# Patient Record
Sex: MALE | Race: WHITE | NOT HISPANIC OR LATINO | Employment: FULL TIME | URBAN - METROPOLITAN AREA
[De-identification: names, ages, dates, MRNs, and addresses within clinical notes are randomized per-mention and may not be internally consistent; named-entity substitution may affect disease eponyms.]

---

## 2017-08-24 ENCOUNTER — OFFICE VISIT (OUTPATIENT)
Dept: FAMILY MEDICINE CLINIC | Facility: CLINIC | Age: 37
End: 2017-08-24

## 2017-08-24 VITALS
DIASTOLIC BLOOD PRESSURE: 69 MMHG | TEMPERATURE: 98.5 F | SYSTOLIC BLOOD PRESSURE: 116 MMHG | HEART RATE: 64 BPM | HEIGHT: 73 IN | WEIGHT: 198 LBS | RESPIRATION RATE: 16 BRPM | BODY MASS INDEX: 26.24 KG/M2

## 2017-08-24 DIAGNOSIS — I83.90 VARICOSE VEIN OF LEG: ICD-10-CM

## 2017-08-24 DIAGNOSIS — G47.00 INSOMNIA, UNSPECIFIED TYPE: Primary | ICD-10-CM

## 2017-08-24 PROCEDURE — 99213 OFFICE O/P EST LOW 20 MIN: CPT | Performed by: FAMILY MEDICINE

## 2017-08-24 RX ORDER — ZOLPIDEM TARTRATE 10 MG/1
10 TABLET ORAL NIGHTLY PRN
Qty: 30 TABLET | Refills: 2 | Status: SHIPPED | OUTPATIENT
Start: 2017-08-24 | End: 2020-07-28 | Stop reason: SDUPTHER

## 2017-08-24 NOTE — PROGRESS NOTES
"Subjective   Mario East is a 37 y.o. male.     CC: Leg Issue/Insomnia    History of Present Illness     Pt comes in today to discuss a several month h/o left LE tenderness issue, along with some insomnia issues. Reports the left LE has a dime-size area that hurts only with touching it.  Has uses Ambien well prior and desires a refill.    The following portions of the patient's history were reviewed and updated as appropriate: allergies, current medications, past family history, past medical history, past social history, past surgical history and problem list.    Review of Systems   Constitutional: Negative for activity change, chills, fatigue and fever.   Respiratory: Negative for cough and shortness of breath.    Cardiovascular: Negative for chest pain and palpitations.   Gastrointestinal: Negative for abdominal pain.   Endocrine: Negative for cold intolerance.   Neurological: Positive for numbness.   Psychiatric/Behavioral: Positive for sleep disturbance. Negative for behavioral problems and dysphoric mood. The patient is not nervous/anxious.      /69  Pulse 64  Temp 98.5 °F (36.9 °C) (Oral)   Resp 16  Ht 73\" (185.4 cm)  Wt 198 lb (89.8 kg)  BMI 26.12 kg/m2    Objective   Physical Exam   Constitutional: He appears well-developed and well-nourished.   Neck: Neck supple. No thyromegaly present.   Cardiovascular: Normal rate and regular rhythm.    No murmur heard.  Small, mildly tender varicose vein LLE mid-calf medial   Pulmonary/Chest: Effort normal and breath sounds normal.   Abdominal: Bowel sounds are normal.   Psychiatric: He has a normal mood and affect. His behavior is normal.   Nursing note and vitals reviewed.    The patient has read and signed the [facility] Controlled Substance Contract.  I will continue to see patient for regular follow up appointments.  They are well controlled on their medication.  YEHUDA has been reviewed by me and is updated every 3 months. The patient is aware " of the potential for addiction and dependence.    Assessment/Plan   Mario was seen today for left lower leg sensitive and insomnia.    Diagnoses and all orders for this visit:    Insomnia, unspecified type  -     zolpidem (AMBIEN) 10 MG tablet; Take 1 tablet by mouth At Night As Needed for Sleep.    Varicose vein of leg    Compression discussed.

## 2017-11-02 ENCOUNTER — OFFICE VISIT (OUTPATIENT)
Dept: FAMILY MEDICINE CLINIC | Facility: CLINIC | Age: 37
End: 2017-11-02

## 2017-11-02 VITALS
TEMPERATURE: 98.4 F | SYSTOLIC BLOOD PRESSURE: 127 MMHG | BODY MASS INDEX: 26.11 KG/M2 | DIASTOLIC BLOOD PRESSURE: 64 MMHG | HEIGHT: 73 IN | RESPIRATION RATE: 16 BRPM | WEIGHT: 197 LBS | HEART RATE: 70 BPM

## 2017-11-02 DIAGNOSIS — Z23 IMMUNIZATION DUE: Primary | ICD-10-CM

## 2017-11-02 DIAGNOSIS — M54.6 CHRONIC MIDLINE THORACIC BACK PAIN: ICD-10-CM

## 2017-11-02 DIAGNOSIS — G89.29 CHRONIC MIDLINE THORACIC BACK PAIN: ICD-10-CM

## 2017-11-02 PROCEDURE — 90471 IMMUNIZATION ADMIN: CPT | Performed by: FAMILY MEDICINE

## 2017-11-02 PROCEDURE — 99213 OFFICE O/P EST LOW 20 MIN: CPT | Performed by: FAMILY MEDICINE

## 2017-11-02 PROCEDURE — 90686 IIV4 VACC NO PRSV 0.5 ML IM: CPT | Performed by: FAMILY MEDICINE

## 2017-11-02 RX ORDER — MELOXICAM 15 MG/1
15 TABLET ORAL DAILY
Qty: 30 TABLET | Refills: 11 | Status: SHIPPED | OUTPATIENT
Start: 2017-11-02 | End: 2018-11-12

## 2017-11-02 RX ORDER — CYCLOBENZAPRINE HCL 10 MG
10 TABLET ORAL 2 TIMES DAILY PRN
Qty: 60 TABLET | Refills: 2 | Status: SHIPPED | OUTPATIENT
Start: 2017-11-02 | End: 2018-11-12

## 2017-11-02 NOTE — PROGRESS NOTES
"Subjective   Mario East is a 37 y.o. male.     CC: Back Pain    History of Present Illness     Pt comes in today c/o mid-back pain x 6-9 months. Started upon awakenings in the AMs (changed mattress w/o help), no injury known. Went to a chiropractor w/o help. Described as \"sore\". Heat helps some.tried Advil for the first time. Mainly just in the AM. No mobility limitations. Always in the middle.  FH: GF had some back issues when older. XRAYS at the chiropractor was told he had a \"straight neck\" and had a little curvature of the mid-back.no radiation.        The following portions of the patient's history were reviewed and updated as appropriate: allergies, current medications, past family history, past medical history, past social history, past surgical history and problem list.    Review of Systems   Constitutional: Negative for activity change, chills, fatigue and fever.   Respiratory: Negative for cough and shortness of breath.    Cardiovascular: Negative for chest pain and palpitations.   Gastrointestinal: Negative for abdominal pain.   Endocrine: Negative for cold intolerance.   Musculoskeletal: Positive for back pain.   Psychiatric/Behavioral: Negative for behavioral problems and dysphoric mood. The patient is not nervous/anxious.      /64  Pulse 70  Temp 98.4 °F (36.9 °C) (Oral)   Resp 16  Ht 73\" (185.4 cm)  Wt 197 lb (89.4 kg)  BMI 25.99 kg/m2    Objective   Physical Exam   Constitutional: He appears well-developed and well-nourished.   Neck: Neck supple. No thyromegaly present.   Cardiovascular: Normal rate and regular rhythm.    No murmur heard.  Pulmonary/Chest: Effort normal and breath sounds normal.   Abdominal: Bowel sounds are normal.   Psychiatric: He has a normal mood and affect. His behavior is normal.   Nursing note and vitals reviewed.      Assessment/Plan   Mario was seen today for back pain and immunizations.    Diagnoses and all orders for this visit:    Immunization due  -  "    Flu Vaccine Quad PF 3YR+    Chronic midline thoracic back pain  -     meloxicam (MOBIC) 15 MG tablet; Take 1 tablet by mouth Daily.  -     cyclobenzaprine (FLEXERIL) 10 MG tablet; Take 1 tablet by mouth 2 (Two) Times a Day As Needed for Muscle Spasms.

## 2018-04-05 ENCOUNTER — TELEPHONE (OUTPATIENT)
Dept: FAMILY MEDICINE CLINIC | Facility: CLINIC | Age: 38
End: 2018-04-05

## 2018-04-05 DIAGNOSIS — G89.29 CHRONIC MIDLINE THORACIC BACK PAIN: Primary | ICD-10-CM

## 2018-04-05 DIAGNOSIS — M54.6 CHRONIC MIDLINE THORACIC BACK PAIN: Primary | ICD-10-CM

## 2018-05-24 ENCOUNTER — OFFICE VISIT (OUTPATIENT)
Dept: FAMILY MEDICINE CLINIC | Facility: CLINIC | Age: 38
End: 2018-05-24

## 2018-05-24 VITALS
SYSTOLIC BLOOD PRESSURE: 109 MMHG | DIASTOLIC BLOOD PRESSURE: 55 MMHG | BODY MASS INDEX: 26.37 KG/M2 | OXYGEN SATURATION: 98 % | RESPIRATION RATE: 16 BRPM | TEMPERATURE: 98.3 F | HEART RATE: 71 BPM | HEIGHT: 73 IN | WEIGHT: 199 LBS

## 2018-05-24 DIAGNOSIS — R53.82 CHRONIC FATIGUE: ICD-10-CM

## 2018-05-24 DIAGNOSIS — Z00.00 ANNUAL PHYSICAL EXAM: Primary | ICD-10-CM

## 2018-05-24 PROCEDURE — 99395 PREV VISIT EST AGE 18-39: CPT | Performed by: FAMILY MEDICINE

## 2018-05-24 PROCEDURE — 93000 ELECTROCARDIOGRAM COMPLETE: CPT | Performed by: FAMILY MEDICINE

## 2018-05-24 NOTE — PROGRESS NOTES
"Subjective   Mario East is a 38 y.o. male.     CC: Annual Exam    History of Present Illness     Mario East 38 y.o. male who presents for an Annual Wellness Visit.  he has a history of   Patient Active Problem List   Diagnosis   • Varicose vein of leg   .  he has been feeling well.  Labs results discussed in detail with the patient.  Plan to update vaccines if needed today.  I  reviewed health maintenance with him as part of my preventative care plan.    Health Habits:  Dental Exam. up to date  Eye Exam. up to date  Exercise: 5 times/week.  Current exercise activities include: running/ jogging and weightlifting    Pt going to PT/Chiropractor for his chronic back issues.    The following portions of the patient's history were reviewed and updated as appropriate: allergies, current medications, past family history, past medical history, past social history, past surgical history and problem list.    Review of Systems   Constitutional: Positive for fatigue. Negative for appetite change, fever and unexpected weight change.   HENT: Negative for ear pain, facial swelling and sore throat.    Eyes: Negative for pain and visual disturbance.   Respiratory: Negative for chest tightness, shortness of breath and wheezing.    Cardiovascular: Negative for chest pain and palpitations.   Gastrointestinal: Negative for abdominal pain and blood in stool.   Endocrine: Negative.    Genitourinary: Negative for difficulty urinating and hematuria.   Musculoskeletal: Negative for joint swelling.   Neurological: Negative for tremors, seizures and syncope.   Hematological: Negative for adenopathy.   Psychiatric/Behavioral: Negative.      /55   Pulse 71   Temp 98.3 °F (36.8 °C)   Resp 16   Ht 185.4 cm (72.99\")   Wt 90.3 kg (199 lb)   SpO2 98%   BMI 26.26 kg/m²     Objective   Physical Exam   Constitutional: He is oriented to person, place, and time. He appears well-developed and well-nourished. No distress.   HENT: "   Head: Normocephalic and atraumatic. Hair is normal.   Right Ear: Hearing, tympanic membrane, external ear and ear canal normal.   Left Ear: Hearing, tympanic membrane, external ear and ear canal normal.   Nose: No sinus tenderness or nasal deformity.   Mouth/Throat: Uvula is midline, oropharynx is clear and moist and mucous membranes are normal. No oral lesions. No uvula swelling.   Eyes: Conjunctivae, EOM and lids are normal. Pupils are equal, round, and reactive to light. Right eye exhibits no discharge. Left eye exhibits no discharge. No scleral icterus. Right eye exhibits normal extraocular motion and no nystagmus. Left eye exhibits normal extraocular motion and no nystagmus.   Fundoscopic exam:       The right eye shows red reflex.        The left eye shows red reflex.   Neck: Normal range of motion. Neck supple. No JVD present. No tracheal deviation present. No thyromegaly present.   Cardiovascular: Normal rate, regular rhythm, normal heart sounds, intact distal pulses and normal pulses.  Exam reveals no gallop.    No murmur heard.  Pulmonary/Chest: Effort normal and breath sounds normal. No respiratory distress. He has no wheezes. He has no rales. He exhibits no tenderness.   Abdominal: Soft. Bowel sounds are normal. He exhibits no distension and no mass. There is no tenderness. There is no guarding. No hernia.   Musculoskeletal: Normal range of motion. He exhibits no edema, tenderness or deformity.   Lymphadenopathy:     He has no cervical adenopathy.   Neurological: He is alert and oriented to person, place, and time. He has normal reflexes. He displays normal reflexes. No cranial nerve deficit. He exhibits normal muscle tone. Coordination normal.   Skin: Skin is warm and dry. No rash noted. He is not diaphoretic.   Psychiatric: He has a normal mood and affect. His behavior is normal. Judgment and thought content normal.   Nursing note and vitals reviewed.      Assessment/Plan   Mario was seen today for  annual exam.    Diagnoses and all orders for this visit:    Annual physical exam  -     Comprehensive metabolic panel  -     Lipid panel  -     CBC and Differential  -     TSH  -     ECG 12 Lead    Chronic fatigue  -     Testosterone, Free, Total    Diet/exercise discussed.

## 2018-05-24 NOTE — PROGRESS NOTES
Procedure     ECG 12 Lead  Date/Time: 5/24/2018 2:38 PM  Performed by: TATUM HERNANDEZ  Authorized by: TATUM HERNANDEZ   Interpreted by ED physician  Comparison: not compared with previous ECG   Previous ECG: no previous ECG available  Rhythm: sinus rhythm  Rate: normal  Conduction: conduction normal  ST Segments: ST segments normal  T Waves: T waves normal  QRS axis: normal  Clinical impression: normal ECG

## 2018-06-13 ENCOUNTER — OFFICE VISIT (OUTPATIENT)
Dept: FAMILY MEDICINE CLINIC | Facility: CLINIC | Age: 38
End: 2018-06-13

## 2018-06-13 ENCOUNTER — TELEPHONE (OUTPATIENT)
Dept: FAMILY MEDICINE CLINIC | Facility: CLINIC | Age: 38
End: 2018-06-13

## 2018-06-13 ENCOUNTER — HOSPITAL ENCOUNTER (INPATIENT)
Facility: HOSPITAL | Age: 38
LOS: 2 days | Discharge: HOME | End: 2018-06-15
Attending: INTERNAL MEDICINE | Admitting: INTERNAL MEDICINE

## 2018-06-13 VITALS
WEIGHT: 199 LBS | DIASTOLIC BLOOD PRESSURE: 76 MMHG | TEMPERATURE: 98 F | HEART RATE: 86 BPM | BODY MASS INDEX: 26.37 KG/M2 | HEIGHT: 73 IN | SYSTOLIC BLOOD PRESSURE: 129 MMHG | RESPIRATION RATE: 16 BRPM

## 2018-06-13 DIAGNOSIS — S81.811A LACERATION OF RIGHT LOWER LEG, INITIAL ENCOUNTER: Primary | ICD-10-CM

## 2018-06-13 DIAGNOSIS — L03.115 CELLULITIS OF RIGHT LOWER EXTREMITY: ICD-10-CM

## 2018-06-13 LAB
ALBUMIN SERPL-MCNC: 4.6 G/DL (ref 3.5–5.2)
ALBUMIN/GLOB SERPL: 1.7 G/DL
ALP SERPL-CCNC: 145 U/L (ref 39–117)
ALT SERPL W P-5'-P-CCNC: 137 U/L (ref 1–41)
ANION GAP SERPL CALCULATED.3IONS-SCNC: 9.9 MMOL/L
AST SERPL-CCNC: 110 U/L (ref 1–40)
BASOPHILS # BLD AUTO: 0.02 10*3/MM3 (ref 0–0.2)
BASOPHILS NFR BLD AUTO: 0.2 % (ref 0–1.5)
BILIRUB SERPL-MCNC: 0.4 MG/DL (ref 0.1–1.2)
BUN BLD-MCNC: 16 MG/DL (ref 6–20)
BUN/CREAT SERPL: 13.6 (ref 7–25)
CALCIUM SPEC-SCNC: 9.7 MG/DL (ref 8.6–10.5)
CHLORIDE SERPL-SCNC: 102 MMOL/L (ref 98–107)
CO2 SERPL-SCNC: 30.1 MMOL/L (ref 22–29)
CREAT BLD-MCNC: 1.18 MG/DL (ref 0.76–1.27)
DEPRECATED RDW RBC AUTO: 43.8 FL (ref 37–54)
EOSINOPHIL # BLD AUTO: 0.05 10*3/MM3 (ref 0–0.7)
EOSINOPHIL NFR BLD AUTO: 0.4 % (ref 0.3–6.2)
ERYTHROCYTE [DISTWIDTH] IN BLOOD BY AUTOMATED COUNT: 12.7 % (ref 11.5–14.5)
GFR SERPL CREATININE-BSD FRML MDRD: 69 ML/MIN/1.73
GLOBULIN UR ELPH-MCNC: 2.7 GM/DL
GLUCOSE BLD-MCNC: 94 MG/DL (ref 65–99)
HCT VFR BLD AUTO: 43.6 % (ref 40.4–52.2)
HGB BLD-MCNC: 14.2 G/DL (ref 13.7–17.6)
IMM GRANULOCYTES # BLD: 0 10*3/MM3 (ref 0–0.03)
IMM GRANULOCYTES NFR BLD: 0 % (ref 0–0.5)
LYMPHOCYTES # BLD AUTO: 1.4 10*3/MM3 (ref 0.9–4.8)
LYMPHOCYTES NFR BLD AUTO: 11.3 % (ref 19.6–45.3)
MCH RBC QN AUTO: 30.6 PG (ref 27–32.7)
MCHC RBC AUTO-ENTMCNC: 32.6 G/DL (ref 32.6–36.4)
MCV RBC AUTO: 94 FL (ref 79.8–96.2)
MONOCYTES # BLD AUTO: 1.7 10*3/MM3 (ref 0.2–1.2)
MONOCYTES NFR BLD AUTO: 13.7 % (ref 5–12)
NEUTROPHILS # BLD AUTO: 9.26 10*3/MM3 (ref 1.9–8.1)
NEUTROPHILS NFR BLD AUTO: 74.4 % (ref 42.7–76)
PLATELET # BLD AUTO: 209 10*3/MM3 (ref 140–500)
PMV BLD AUTO: 9.6 FL (ref 6–12)
POTASSIUM BLD-SCNC: 3.8 MMOL/L (ref 3.5–5.2)
PROT SERPL-MCNC: 7.3 G/DL (ref 6–8.5)
RBC # BLD AUTO: 4.64 10*6/MM3 (ref 4.6–6)
SODIUM BLD-SCNC: 142 MMOL/L (ref 136–145)
WBC NRBC COR # BLD: 12.43 10*3/MM3 (ref 4.5–10.7)

## 2018-06-13 PROCEDURE — 25010000002 VANCOMYCIN 10 G RECONSTITUTED SOLUTION: Performed by: INTERNAL MEDICINE

## 2018-06-13 PROCEDURE — 87040 BLOOD CULTURE FOR BACTERIA: CPT | Performed by: INTERNAL MEDICINE

## 2018-06-13 PROCEDURE — 99213 OFFICE O/P EST LOW 20 MIN: CPT | Performed by: FAMILY MEDICINE

## 2018-06-13 PROCEDURE — 85025 COMPLETE CBC W/AUTO DIFF WBC: CPT | Performed by: INTERNAL MEDICINE

## 2018-06-13 PROCEDURE — 80053 COMPREHEN METABOLIC PANEL: CPT | Performed by: INTERNAL MEDICINE

## 2018-06-13 PROCEDURE — 25010000002 PIPERACILLIN SOD-TAZOBACTAM PER 1 G: Performed by: INTERNAL MEDICINE

## 2018-06-13 RX ORDER — SODIUM CHLORIDE 9 MG/ML
50 INJECTION, SOLUTION INTRAVENOUS CONTINUOUS
Status: DISCONTINUED | OUTPATIENT
Start: 2018-06-13 | End: 2018-06-15

## 2018-06-13 RX ORDER — ZOLPIDEM TARTRATE 5 MG/1
10 TABLET ORAL NIGHTLY PRN
Status: DISCONTINUED | OUTPATIENT
Start: 2018-06-13 | End: 2018-06-15 | Stop reason: HOSPADM

## 2018-06-13 RX ORDER — IBUPROFEN 200 MG
200 TABLET ORAL EVERY 6 HOURS PRN
COMMUNITY
End: 2021-08-05

## 2018-06-13 RX ORDER — ONDANSETRON 2 MG/ML
4 INJECTION INTRAMUSCULAR; INTRAVENOUS EVERY 6 HOURS PRN
Status: DISCONTINUED | OUTPATIENT
Start: 2018-06-13 | End: 2018-06-15 | Stop reason: HOSPADM

## 2018-06-13 RX ORDER — ACETAMINOPHEN 325 MG/1
650 TABLET ORAL EVERY 6 HOURS PRN
Status: DISCONTINUED | OUTPATIENT
Start: 2018-06-13 | End: 2018-06-15 | Stop reason: HOSPADM

## 2018-06-13 RX ORDER — HEPARIN SODIUM 5000 [USP'U]/ML
5000 INJECTION, SOLUTION INTRAVENOUS; SUBCUTANEOUS EVERY 12 HOURS SCHEDULED
Status: DISCONTINUED | OUTPATIENT
Start: 2018-06-13 | End: 2018-06-15

## 2018-06-13 RX ORDER — SODIUM CHLORIDE 0.9 % (FLUSH) 0.9 %
1-10 SYRINGE (ML) INJECTION AS NEEDED
Status: DISCONTINUED | OUTPATIENT
Start: 2018-06-13 | End: 2018-06-15 | Stop reason: HOSPADM

## 2018-06-13 RX ORDER — HYDROCODONE BITARTRATE AND ACETAMINOPHEN 7.5; 325 MG/1; MG/1
1 TABLET ORAL EVERY 4 HOURS PRN
Status: DISCONTINUED | OUTPATIENT
Start: 2018-06-13 | End: 2018-06-15 | Stop reason: HOSPADM

## 2018-06-13 RX ORDER — CEPHALEXIN 500 MG/1
500 TABLET ORAL
COMMUNITY
Start: 2018-06-11 | End: 2018-06-15 | Stop reason: HOSPADM

## 2018-06-13 RX ADMIN — ACETAMINOPHEN 650 MG: 325 TABLET ORAL at 20:41

## 2018-06-13 RX ADMIN — VANCOMYCIN HYDROCHLORIDE 1750 MG: 10 INJECTION, POWDER, LYOPHILIZED, FOR SOLUTION INTRAVENOUS at 18:51

## 2018-06-13 RX ADMIN — TAZOBACTAM SODIUM AND PIPERACILLIN SODIUM 3.38 G: 375; 3 INJECTION, SOLUTION INTRAVENOUS at 18:51

## 2018-06-13 RX ADMIN — SODIUM CHLORIDE 100 ML/HR: 9 INJECTION, SOLUTION INTRAVENOUS at 18:01

## 2018-06-13 NOTE — PROGRESS NOTES
"Pharmacokinetic Evaluation - Vancomycin and zosyn    Mario East Jr. is a 38 y.o. male on vancomycin and Zosyn pharmacy to dose.  MRN: [id number]  : 1980    Day of therapy: 1  Indication: SSTI  Consulted by: Dr. Patel  Goal trough: 15-20 mcg/ml    Current dose:   TBD    Blood pressure 125/73, pulse 78, temperature 98.9 °F (37.2 °C), temperature source Oral, resp. rate 16, height 185.4 cm (73\"), SpO2 100 %.      CrCl cannot be calculated (No order found.).        Cultures/ labs/ radiology:    blood cx pending    Vancomycin dosing hx (include troughs if drawn):   1851: 1750mg IV x1    Assessment:  Pt has right lower leg wound after stepping in a hidden drain pipe. Was on cephalexin outpt with worsening pain, swelling, and oozing from wound.    Plan:  1) Start vancomycin 1750mg IV q12h maintenance dose.  2) Trough due prior to dose at 0600 on 6/15.  Encourage hydration as allowed by MD to prevent toxic accumulation. Monitor for signs and symptoms of toxicity or intolerance including rash, increase in Scr or decrease in UOP.   Pharmacy will continue to follow and adjust as needed.    Hailey Junior [facility]    "

## 2018-06-13 NOTE — PLAN OF CARE
Problem: Patient Care Overview  Goal: Plan of Care Review  Outcome: Ongoing (interventions implemented as appropriate)   06/13/18 6118   Coping/Psychosocial   Plan of Care Reviewed With patient   Plan of Care Review   Progress no change   OTHER   Outcome Summary Patient a direct admit to [facility] for RLE Cellulitis. IV fluids and antibiotics started. Blood cultures x2 completed. MD to evaluate and patient is requesting pain medication. Will continue to monitor.      Goal: Individualization and Mutuality  Outcome: Ongoing (interventions implemented as appropriate)    Goal: Discharge Needs Assessment  Outcome: Ongoing (interventions implemented as appropriate)    Goal: Interprofessional Rounds/Family Conf  Outcome: Ongoing (interventions implemented as appropriate)      Problem: Pain, Acute (Adult)  Goal: Identify Related Risk Factors and Signs and Symptoms  Outcome: Outcome(s) achieved Date Met: 06/13/18    Goal: Acceptable Pain Control/Comfort Level  Outcome: Ongoing (interventions implemented as appropriate)      Problem: Infection, Risk/Actual (Adult)  Goal: Identify Related Risk Factors and Signs and Symptoms  Outcome: Outcome(s) achieved Date Met: 06/13/18    Goal: Infection Prevention/Resolution  Outcome: Ongoing (interventions implemented as appropriate)

## 2018-06-13 NOTE — TELEPHONE ENCOUNTER
[place] with [facility] notified of pre-cert number [id number] spoke with sammi at [facility] [phone] shaheed

## 2018-06-13 NOTE — PROGRESS NOTES
Subjective   Mario East is a 38 y.o. male.     CC: ED F/U for Laceration    History of Present Illness     Pt returns today after recent hospitalization. That visit was as follows:    Chief Complaint   Patient presents with   • Laceration   tripped in a drain hole in grass. laceration to right lower leg.     The history is provided by the patient. No  was used.     PCP is Allen Jung MD.    HPI:  Chief Complaint: laceration after stepping in hidden plastic drainpipe    Context: Pt is a 38 yr/o male No PMHx who presents with laceration after stepping in hidden plastic drainpipe just PTA. He has been capable of bearing weight, FROM and full sensation to the foot. Denies paresthesias, weakness, loss of sensation to extremities.     Suture size: 4-0  Suture material: Vicryl  Suture technique: Simple interrupted  Number of sutures: 4  Skin repair:   Repair method: Sutures  Suture size: 3-0  Suture material: Nylon  Suture technique: Horizontal mattress  Number of sutures: 4 (plus 2 simple interrrupted 3-0 nylon sutures    Medications given in ED:  Medications   TDaP Booster (ADACEL) injection 0.5 mL (0.5 mLs Intramuscular Given 6/11/18 2044)   naproxen (NAPROSYN) tablet 500 mg (500 mg Oral Given 6/11/18 2045)       Signed Prescriptions Disp Refills   • Cephalexin 500 MG tablet 30 tablet 0   Sig: Take 1 tablet by mouth 3 (three) times daily for 10 days     Sadly, even though he has been on the abx since then, the redness and swelling and pain of the RLE has rapidly worsened, even with some slight oozing from the wound.    The following portions of the patient's history were reviewed and updated as appropriate: allergies, current medications, past family history, past medical history, past social history, past surgical history and problem list.    Review of Systems   Constitutional: Negative for activity change, chills, fatigue and fever.   Respiratory: Negative for cough and shortness of  "breath.    Cardiovascular: Negative for chest pain and palpitations.   Gastrointestinal: Negative for abdominal pain.   Endocrine: Negative for cold intolerance.   Skin:        laceration   Psychiatric/Behavioral: Negative for behavioral problems and dysphoric mood. The patient is not nervous/anxious.      /76   Pulse 86   Temp 98 °F (36.7 °C) (Oral)   Resp 16   Ht 185.4 cm (72.99\")   Wt 90.3 kg (199 lb)   BMI 26.26 kg/m²     Objective   Physical Exam   Constitutional: He appears well-developed and well-nourished.   Neck: Neck supple. No thyromegaly present.   Cardiovascular: Normal rate and regular rhythm.    No murmur heard.  Pulses:       Dorsalis pedis pulses are 2+ on the right side, and 2+ on the left side.        Posterior tibial pulses are 2+ on the right side, and 2+ on the left side.   Bilateral LE 40cm circumferential 10cm below the Tibia Plateau   Pulmonary/Chest: Effort normal and breath sounds normal.   Abdominal: Bowel sounds are normal. There is no tenderness.   Musculoskeletal: He exhibits tenderness (around the laceration site, but also proximal and distal, due to erythmea/warmth/swelling.).   Psychiatric: He has a normal mood and affect. His behavior is normal.   Nursing note and vitals reviewed.  Hospital records reviewed with pt confirming HPI.      Assessment/Plan   Mario was seen today for wound infection.    Diagnoses and all orders for this visit:    Laceration of right lower leg, initial encounter    Cellulitis of right lower extremity  Comments:  failure outpatient therapy    Other orders  -     Cancel: Anaerobic Culture - Swab, Leg, Right  -     Cancel: Culture, Routine - Swab,; Future  -     Cancel: Culture, Routine - Swab, Leg, Right    Pt will be direct admitted to the hospital through A.             "

## 2018-06-14 ENCOUNTER — APPOINTMENT (OUTPATIENT)
Dept: MRI IMAGING | Facility: HOSPITAL | Age: 38
End: 2018-06-14

## 2018-06-14 ENCOUNTER — APPOINTMENT (OUTPATIENT)
Dept: GENERAL RADIOLOGY | Facility: HOSPITAL | Age: 38
End: 2018-06-14
Attending: INTERNAL MEDICINE

## 2018-06-14 LAB
ALBUMIN SERPL-MCNC: 3.9 G/DL (ref 3.5–5.2)
ALBUMIN/GLOB SERPL: 1.5 G/DL
ALP SERPL-CCNC: 153 U/L (ref 39–117)
ALT SERPL W P-5'-P-CCNC: 188 U/L (ref 1–41)
ANION GAP SERPL CALCULATED.3IONS-SCNC: 11.5 MMOL/L
AST SERPL-CCNC: 133 U/L (ref 1–40)
BASOPHILS # BLD AUTO: 0.02 10*3/MM3 (ref 0–0.2)
BASOPHILS NFR BLD AUTO: 0.2 % (ref 0–1.5)
BILIRUB SERPL-MCNC: 0.6 MG/DL (ref 0.1–1.2)
BUN BLD-MCNC: 11 MG/DL (ref 6–20)
BUN/CREAT SERPL: 9.9 (ref 7–25)
CALCIUM SPEC-SCNC: 9.3 MG/DL (ref 8.6–10.5)
CHLORIDE SERPL-SCNC: 103 MMOL/L (ref 98–107)
CO2 SERPL-SCNC: 26.5 MMOL/L (ref 22–29)
CREAT BLD-MCNC: 1.11 MG/DL (ref 0.76–1.27)
CRP SERPL-MCNC: 12.39 MG/DL (ref 0–0.5)
DEPRECATED RDW RBC AUTO: 43.8 FL (ref 37–54)
EOSINOPHIL # BLD AUTO: 0.05 10*3/MM3 (ref 0–0.7)
EOSINOPHIL NFR BLD AUTO: 0.6 % (ref 0.3–6.2)
ERYTHROCYTE [DISTWIDTH] IN BLOOD BY AUTOMATED COUNT: 12.8 % (ref 11.5–14.5)
ERYTHROCYTE [SEDIMENTATION RATE] IN BLOOD: 20 MM/HR (ref 0–15)
GFR SERPL CREATININE-BSD FRML MDRD: 74 ML/MIN/1.73
GLOBULIN UR ELPH-MCNC: 2.6 GM/DL
GLUCOSE BLD-MCNC: 104 MG/DL (ref 65–99)
HCT VFR BLD AUTO: 41.8 % (ref 40.4–52.2)
HGB BLD-MCNC: 13.4 G/DL (ref 13.7–17.6)
IMM GRANULOCYTES # BLD: 0.02 10*3/MM3 (ref 0–0.03)
IMM GRANULOCYTES NFR BLD: 0.2 % (ref 0–0.5)
LYMPHOCYTES # BLD AUTO: 1.47 10*3/MM3 (ref 0.9–4.8)
LYMPHOCYTES NFR BLD AUTO: 16.8 % (ref 19.6–45.3)
MCH RBC QN AUTO: 30.1 PG (ref 27–32.7)
MCHC RBC AUTO-ENTMCNC: 32.1 G/DL (ref 32.6–36.4)
MCV RBC AUTO: 93.9 FL (ref 79.8–96.2)
MONOCYTES # BLD AUTO: 1.54 10*3/MM3 (ref 0.2–1.2)
MONOCYTES NFR BLD AUTO: 17.6 % (ref 5–12)
NEUTROPHILS # BLD AUTO: 5.63 10*3/MM3 (ref 1.9–8.1)
NEUTROPHILS NFR BLD AUTO: 64.6 % (ref 42.7–76)
PLATELET # BLD AUTO: 189 10*3/MM3 (ref 140–500)
PMV BLD AUTO: 9.8 FL (ref 6–12)
POTASSIUM BLD-SCNC: 3.8 MMOL/L (ref 3.5–5.2)
PROT SERPL-MCNC: 6.5 G/DL (ref 6–8.5)
RBC # BLD AUTO: 4.45 10*6/MM3 (ref 4.6–6)
SODIUM BLD-SCNC: 141 MMOL/L (ref 136–145)
WBC NRBC COR # BLD: 8.73 10*3/MM3 (ref 4.5–10.7)

## 2018-06-14 PROCEDURE — 85025 COMPLETE CBC W/AUTO DIFF WBC: CPT | Performed by: INTERNAL MEDICINE

## 2018-06-14 PROCEDURE — 86140 C-REACTIVE PROTEIN: CPT | Performed by: PHYSICIAN ASSISTANT

## 2018-06-14 PROCEDURE — 25010000002 VANCOMYCIN 10 G RECONSTITUTED SOLUTION: Performed by: INTERNAL MEDICINE

## 2018-06-14 PROCEDURE — 25010000002 HEPARIN (PORCINE) PER 1000 UNITS: Performed by: INTERNAL MEDICINE

## 2018-06-14 PROCEDURE — 73590 X-RAY EXAM OF LOWER LEG: CPT

## 2018-06-14 PROCEDURE — 80053 COMPREHEN METABOLIC PANEL: CPT | Performed by: INTERNAL MEDICINE

## 2018-06-14 PROCEDURE — 25010000002 PIPERACILLIN SOD-TAZOBACTAM PER 1 G: Performed by: INTERNAL MEDICINE

## 2018-06-14 PROCEDURE — 73718 MRI LOWER EXTREMITY W/O DYE: CPT

## 2018-06-14 PROCEDURE — 85652 RBC SED RATE AUTOMATED: CPT | Performed by: PHYSICIAN ASSISTANT

## 2018-06-14 RX ADMIN — TAZOBACTAM SODIUM AND PIPERACILLIN SODIUM 3.38 G: 375; 3 INJECTION, SOLUTION INTRAVENOUS at 00:15

## 2018-06-14 RX ADMIN — HEPARIN SODIUM 5000 UNITS: 5000 INJECTION, SOLUTION INTRAVENOUS; SUBCUTANEOUS at 08:35

## 2018-06-14 RX ADMIN — HEPARIN SODIUM 5000 UNITS: 5000 INJECTION, SOLUTION INTRAVENOUS; SUBCUTANEOUS at 00:20

## 2018-06-14 RX ADMIN — ACETAMINOPHEN 650 MG: 325 TABLET ORAL at 08:34

## 2018-06-14 RX ADMIN — TAZOBACTAM SODIUM AND PIPERACILLIN SODIUM 3.38 G: 375; 3 INJECTION, SOLUTION INTRAVENOUS at 08:35

## 2018-06-14 RX ADMIN — SODIUM CHLORIDE 100 ML/HR: 9 INJECTION, SOLUTION INTRAVENOUS at 10:18

## 2018-06-14 RX ADMIN — ZOLPIDEM TARTRATE 10 MG: 5 TABLET ORAL at 21:09

## 2018-06-14 RX ADMIN — VANCOMYCIN HYDROCHLORIDE 1750 MG: 10 INJECTION, POWDER, LYOPHILIZED, FOR SOLUTION INTRAVENOUS at 05:28

## 2018-06-14 RX ADMIN — VANCOMYCIN HYDROCHLORIDE 1750 MG: 10 INJECTION, POWDER, LYOPHILIZED, FOR SOLUTION INTRAVENOUS at 18:00

## 2018-06-14 RX ADMIN — ACETAMINOPHEN 650 MG: 325 TABLET ORAL at 14:34

## 2018-06-14 RX ADMIN — ACETAMINOPHEN 650 MG: 325 TABLET ORAL at 21:09

## 2018-06-14 RX ADMIN — HEPARIN SODIUM 5000 UNITS: 5000 INJECTION, SOLUTION INTRAVENOUS; SUBCUTANEOUS at 21:09

## 2018-06-14 RX ADMIN — TAZOBACTAM SODIUM AND PIPERACILLIN SODIUM 3.38 G: 375; 3 INJECTION, SOLUTION INTRAVENOUS at 16:57

## 2018-06-14 NOTE — PROGRESS NOTES
"  Name: Mario East Jr.  ADMIT: 2018   Age/Sex: 38 y.o.male LOS:  LOS: 1 day    :    1980     ROOM: [facility]   MRN:    [id number]    PCP:    Allen Jung MD     Subjective   Pain is fairly well controlled. No other issues.     Objective   Vital Signs  Temp:  [98 °F (36.7 °C)-100.5 °F (38.1 °C)] 98.7 °F (37.1 °C)  Heart Rate:  [67-90] 74  Resp:  [16-18] 18  BP: (111-129)/(60-76) 111/60  Body mass index is 24.8 kg/m².    Objective:  General Appearance:  Comfortable and well-appearing.    Vital signs: (most recent): Blood pressure 111/60, pulse 74, temperature 98.7 °F (37.1 °C), temperature source Oral, resp. rate 18, height 185.4 cm (73\"), weight 85.3 kg (188 lb), SpO2 99 %.  Vital signs are normal.    HEENT: Normal HEENT exam.    Lungs:  Normal effort.  Breath sounds clear to auscultation.    Heart: Normal rate.  Regular rhythm.    Abdomen: Abdomen is soft and non-distended.  Bowel sounds are normal.   There is no abdominal tenderness.     Extremities: There is no dependent edema.  (Large wound on anterior RLE shin with sutures in place, surrounding cellulitis)  Neurological: Patient is alert and oriented to person, place and time.    Skin:  Warm and dry.  No rash.             Results Review:       I reviewed the patient's new clinical results.    Results from last 7 days  Lab Units 18  0649 18  1755   WBC 10*3/mm3 8.73 12.43*   HEMOGLOBIN g/dL 13.4* 14.2   PLATELETS 10*3/mm3 189 209       Results from last 7 days  Lab Units 18  0649 18  1755   SODIUM mmol/L 141 142   POTASSIUM mmol/L 3.8 3.8   CHLORIDE mmol/L 103 102   CO2 mmol/L 26.5 30.1*   BUN mg/dL 11 16   CREATININE mg/dL 1.11 1.18   GLUCOSE mg/dL 104* 94   Estimated Creatinine Clearance: 107.8 mL/min (by C-G formula based on SCr of 1.11 mg/dL).    Results from last 7 days  Lab Units 18  0649 18  1755   CALCIUM mg/dL 9.3 9.7   ALBUMIN g/dL 3.90 4.60       RADIOLOGY  2018  Pending      heparin (porcine) " 5,000 Units Subcutaneous Q12H   piperacillin-tazobactam 3.375 g Intravenous Q8H   vancomycin 1,750 mg Intravenous Q12H       Pharmacy to dose vancomycin     Pharmacy to Dose Zosyn     sodium chloride 50 mL/hr Last Rate: 100 mL/hr (06/14/18 1018)   Diet Regular      Assessment/Plan   Assessment:    Principal Problem:    Cellulitis of right anterior lower leg - traumatic        Plan:   RLE Cellulitis  - cont vanc and zosyn  - reduce IVF rate  - xray results reviewed, going down for MRI now  - Ortho consulted  - cont pain control  - BCx sent    Leukocytosis  - resolved currently, will check inflammatory markers this am    Elevated AST/ALT and Alk phos  - check hep panel, check coags  - may need to limit tylenol  - recheck in am, consider imaging if still trending up    Disposition  TBD.      Juan Carlisle MD  [facility]  06/14/18  10:54 AM

## 2018-06-14 NOTE — PLAN OF CARE
Problem: Patient Care Overview  Goal: Plan of Care Review  Outcome: Ongoing (interventions implemented as appropriate)   06/14/18 0609   Coping/Psychosocial   Plan of Care Reviewed With patient   Plan of Care Review   Progress no change   OTHER   Outcome Summary iv antibitotics and fluids continued. prn tylenol for fever and pain. patient also has prn norco order but has not required any yet. xray ordered for this morning and ortho consult to address if possible osteomyelitis. vitals remain stable. temp elevated. blood cultures pending, foot pumps in place for vte prophylaxis, will continue to monitor.       Problem: Pain, Acute (Adult)  Goal: Acceptable Pain Control/Comfort Level  Outcome: Ongoing (interventions implemented as appropriate)      Problem: Infection, Risk/Actual (Adult)  Goal: Infection Prevention/Resolution  Outcome: Ongoing (interventions implemented as appropriate)

## 2018-06-14 NOTE — CONSULTS
ORTHOPEDIC SURGERY CONSULT      Patient: Mario East Jr.  Date of Admission: 6/13/2018  4:01 PM  YOB: 1980  Medical Record Number: [id number]  Attending Physician: Juan Carlisle MD  Consulting Physician: Keily Tellez PA-C    CHIEF COMPLIANT: Right lower extremity laceration and redness    HISTORY OF PRESENT ILLINESS: Patient is a 38 y.o. year old male presents to [facility] with above complaints.  I was consulted for further evaluation and treatment. Patient states Monday evening he was walking in his neighborhood when he tripped on a drain pipe in his knee brace yard.  His foot fell into the hole and he had a significant laceration on his right leg with bone exposed.  He did go to [facility] emergency room where his laceration was irrigated and repaired with sutures.  He did get a tetanus shot and he did start on Keflex antibiotics.  Yesterday he woke up and had discomfort from his knee to his ankle.  He did notice some redness and throbbing and swelling.  Today he reports no fevers.  He denies numbness tingling or radiation of symptoms.  He denies any pain in the actual knee joint or the ankle joint.  We were consulted for definitive management.    ALLERGIES: No Known Allergies    HOME MEDICATIONS:  Prescriptions Prior to Admission   Medication Sig Dispense Refill Last Dose   • Cephalexin 500 MG tablet Take 500 mg by mouth.   6/13/2018 at Unknown time   • ibuprofen (ADVIL) 200 MG tablet Take 200 mg by mouth Every 6 (Six) Hours As Needed for Mild Pain .   6/13/2018 at Unknown time   • meloxicam (MOBIC) 15 MG tablet Take 1 tablet by mouth Daily. 30 tablet 11 Past Week at Unknown time   • zolpidem (AMBIEN) 10 MG tablet Take 1 tablet by mouth At Night As Needed for Sleep. 30 tablet 2 Past Week at Unknown time   • cyclobenzaprine (FLEXERIL) 10 MG tablet Take 1 tablet by mouth 2 (Two) Times a Day As Needed for Muscle Spasms. 60 tablet 2 Unknown at Unknown  time       CURRENT MEDICATIONS:  Scheduled Meds:  heparin (porcine) 5,000 Units Subcutaneous Q12H   piperacillin-tazobactam 3.375 g Intravenous Q8H   vancomycin 1,750 mg Intravenous Q12H     Continuous Infusions:  Pharmacy to dose vancomycin     Pharmacy to Dose Zosyn     sodium chloride 100 mL/hr Last Rate: 100 mL/hr (06/13/18 1801)     PRN Meds:.•  acetaminophen  •  HYDROcodone-acetaminophen  •  ondansetron  •  Pharmacy to dose vancomycin  •  Pharmacy to Dose Zosyn  •  sodium chloride  •  zolpidem    Past Medical History:   Diagnosis Date   • H/O chest x-ray 01/05/2011    NORMAL   • History of EKG 01/01/2011    NORMAL   • Hx of echocardiogram 01/01/2011    NORMAL CARDIAC ANATOMY PT FORAMEN OVALE (LEFT TO RIGHT SHUNT) GOOD BI VENTRICULAR SYSTOLIC FUNCTION , TRACE TRICUSPID PULMONARY AND MITRAL INSUFFIENCY      No past surgical history on file.  Social History     Occupational History   • Not on file.     Social History Main Topics   • Smoking status: Never Smoker   • Smokeless tobacco: Never Used   • Alcohol use No   • Drug use: No   • Sexual activity: Not on file    Social History     Social History Narrative   • No narrative on file     Family History   Problem Relation Age of Onset   • Alcohol abuse Other    • Arthritis Other    • Depression Other        REVIEW OF SYSTEMS:    HEENT: Patient denies any headaches, vision changes, change in hearing, or tinnitus, Patient denies epistaxis, sinus pain, hoarseness, or dysphagia   Pulmonary: Patient denies any cough, congestion, acute change in SOA or wheezing.   Cardiovascular: Patient denies any change in chest pain, dyspnea, palpitations, weakness, intolerance of exercise, varicosities, change in murmur   Gastrointestinal:  Patient denies change in appetite, melena, change in bowel habits.   Genital/Urinary: Patient denies dysuria, change in color of urine, change in frequency of urination, pain with urgency, change in incontinence, retention.   Musculoskeletal:  Patient denies complaints of acute changes in symptoms of other joints not mentioned above.   Neurological: Patient denies changes in dizziness, tremor, ataxia, or difficulty in speaking or changes in memory.   Endocrine system: Patient denies acute changes in tremors, palpitations, polyuria, polydipsia, polyphagia, diaphoresis, exophthalmos, or goiter.   Psychological: Patient denies thoughts/plans or harming self or other; denies acute changes in depression,  insomnia, night terrors, bishnu, disorientation.   Skin: Patient denies any bruising, rashes, discoloration, pruritus,or wounds not mentioned in history of present illness or chief complaint above.   Hematopoietic: Patient denies current bleeding, epistaxis, hematuria, or melena.    PHYSICAL EXAM:   Vitals:  Vitals:    06/13/18 1804 06/13/18 2226 06/14/18 0058 06/14/18 0529   BP:  127/64 117/62 111/60   BP Location:  Left arm Left arm Left arm   Patient Position:  Sitting Lying Lying   Pulse:  90 67 74   Resp:  18 18 18   Temp:  100.5 °F (38.1 °C) 99.1 °F (37.3 °C) 98.7 °F (37.1 °C)   TempSrc:  Oral Oral Oral   SpO2:  97% 99% 99%   Weight: 85.3 kg (188 lb)      Height:           General:  38 y.o. male who appears about stated age.    Alert, cooperative, in no acute distress         Head:    Normocephalic, without obvious abnormality, atraumatic   Eyes:            Lids and lashes normal, conjunctivae and sclerae normal, no         icterus, no pallor, corneas clear, PERRLA   Ears:    Ears appear intact with no abnormalities noted   Throat:   No oral lesions, no thrush, oral mucosa moist   Neck:   No adenopathy, supple, trachea midline, no JVD   Back:     Limited exam shows no severe kyphosis present,no visible           erythema, no excessive  tenderness to palpation.    Lungs:     Respirations regular, even and unlabored.     Heart:    Normal rate, Pulses palpable   Chest Wall:    No abnormalities observed.   Abdomen:     Normal bowel sounds, no masses, no  organomegaly, soft              non-tender, non-distended, no guarding, no rebound                      tenderness   Rectal:     Deferred   Pulses:   Pulses palpable and equal bilaterally   Skin:   No bleeding, bruising or rash   Lymph nodes:   No palpable adenopathy   Extremities:     Examination of the right lower extremity shows a large laceration that is linear in nature over the anterior aspect of the shin.  Sutures are present.  There is no drainage coming from the incision.  Range of motion of the knee is nonpainful.  Range of motion of the ankle is non-painful.  He does have pain with testing of his EHL against resistance.  Sensation is intact.  Negative Homans.  He does have some mild erythema on the anterior shin from the incision to the ankle.  This is sensitive to light touch.  Pulses distally 2+.    DIAGNOSTIC TEST:  Admission on 06/13/2018   Component Date Value Ref Range Status   • Glucose 06/13/2018 94  65 - 99 mg/dL Final   • BUN 06/13/2018 16  6 - 20 mg/dL Final   • Creatinine 06/13/2018 1.18  0.76 - 1.27 mg/dL Final   • Sodium 06/13/2018 142  136 - 145 mmol/L Final   • Potassium 06/13/2018 3.8  3.5 - 5.2 mmol/L Final   • Chloride 06/13/2018 102  98 - 107 mmol/L Final   • CO2 06/13/2018 30.1* 22.0 - 29.0 mmol/L Final   • Calcium 06/13/2018 9.7  8.6 - 10.5 mg/dL Final   • Total Protein 06/13/2018 7.3  6.0 - 8.5 g/dL Final   • Albumin 06/13/2018 4.60  3.50 - 5.20 g/dL Final   • ALT (SGPT) 06/13/2018 137* 1 - 41 U/L Final   • AST (SGOT) 06/13/2018 110* 1 - 40 U/L Final   • Alkaline Phosphatase 06/13/2018 145* 39 - 117 U/L Final   • Total Bilirubin 06/13/2018 0.4  0.1 - 1.2 mg/dL Final   • eGFR Non African Amer 06/13/2018 69  >60 mL/min/1.73 Final   • Globulin 06/13/2018 2.7  gm/dL Final   • A/G Ratio 06/13/2018 1.7  g/dL Final   • BUN/Creatinine Ratio 06/13/2018 13.6  7.0 - 25.0 Final   • Anion Gap 06/13/2018 9.9  mmol/L Final   • WBC 06/13/2018 12.43* 4.50 - 10.70 10*3/mm3 Final   • RBC 06/13/2018  4.64  4.60 - 6.00 10*6/mm3 Final   • Hemoglobin 06/13/2018 14.2  13.7 - 17.6 g/dL Final   • Hematocrit 06/13/2018 43.6  40.4 - 52.2 % Final   • MCV 06/13/2018 94.0  79.8 - 96.2 fL Final   • MCH 06/13/2018 30.6  27.0 - 32.7 pg Final   • MCHC 06/13/2018 32.6  32.6 - 36.4 g/dL Final   • RDW 06/13/2018 12.7  11.5 - 14.5 % Final   • RDW-SD 06/13/2018 43.8  37.0 - 54.0 fl Final   • MPV 06/13/2018 9.6  6.0 - 12.0 fL Final   • Platelets 06/13/2018 209  140 - 500 10*3/mm3 Final   • Neutrophil % 06/13/2018 74.4  42.7 - 76.0 % Final   • Lymphocyte % 06/13/2018 11.3* 19.6 - 45.3 % Final   • Monocyte % 06/13/2018 13.7* 5.0 - 12.0 % Final   • Eosinophil % 06/13/2018 0.4  0.3 - 6.2 % Final   • Basophil % 06/13/2018 0.2  0.0 - 1.5 % Final   • Immature Grans % 06/13/2018 0.0  0.0 - 0.5 % Final   • Neutrophils, Absolute 06/13/2018 9.26* 1.90 - 8.10 10*3/mm3 Final   • Lymphocytes, Absolute 06/13/2018 1.40  0.90 - 4.80 10*3/mm3 Final   • Monocytes, Absolute 06/13/2018 1.70* 0.20 - 1.20 10*3/mm3 Final   • Eosinophils, Absolute 06/13/2018 0.05  0.00 - 0.70 10*3/mm3 Final   • Basophils, Absolute 06/13/2018 0.02  0.00 - 0.20 10*3/mm3 Final   • Immature Grans, Absolute 06/13/2018 0.00  0.00 - 0.03 10*3/mm3 Final   • Blood Culture 06/13/2018 No growth at less than 24 hours   Preliminary   • Blood Culture 06/13/2018 No growth at less than 24 hours   Preliminary   • Glucose 06/14/2018 104* 65 - 99 mg/dL Final   • BUN 06/14/2018 11  6 - 20 mg/dL Final   • Creatinine 06/14/2018 1.11  0.76 - 1.27 mg/dL Final   • Sodium 06/14/2018 141  136 - 145 mmol/L Final   • Potassium 06/14/2018 3.8  3.5 - 5.2 mmol/L Final   • Chloride 06/14/2018 103  98 - 107 mmol/L Final   • CO2 06/14/2018 26.5  22.0 - 29.0 mmol/L Final   • Calcium 06/14/2018 9.3  8.6 - 10.5 mg/dL Final   • Total Protein 06/14/2018 6.5  6.0 - 8.5 g/dL Final   • Albumin 06/14/2018 3.90  3.50 - 5.20 g/dL Final   • ALT (SGPT) 06/14/2018 188* 1 - 41 U/L Final   • AST (SGOT) 06/14/2018 133* 1 -  40 U/L Final   • Alkaline Phosphatase 06/14/2018 153* 39 - 117 U/L Final   • Total Bilirubin 06/14/2018 0.6  0.1 - 1.2 mg/dL Final   • eGFR Non  Amer 06/14/2018 74  >60 mL/min/1.73 Final   • Globulin 06/14/2018 2.6  gm/dL Final   • A/G Ratio 06/14/2018 1.5  g/dL Final   • BUN/Creatinine Ratio 06/14/2018 9.9  7.0 - 25.0 Final   • Anion Gap 06/14/2018 11.5  mmol/L Final   • WBC 06/14/2018 8.73  4.50 - 10.70 10*3/mm3 Final   • RBC 06/14/2018 4.45* 4.60 - 6.00 10*6/mm3 Final   • Hemoglobin 06/14/2018 13.4* 13.7 - 17.6 g/dL Final   • Hematocrit 06/14/2018 41.8  40.4 - 52.2 % Final   • MCV 06/14/2018 93.9  79.8 - 96.2 fL Final   • MCH 06/14/2018 30.1  27.0 - 32.7 pg Final   • MCHC 06/14/2018 32.1* 32.6 - 36.4 g/dL Final   • RDW 06/14/2018 12.8  11.5 - 14.5 % Final   • RDW-SD 06/14/2018 43.8  37.0 - 54.0 fl Final   • MPV 06/14/2018 9.8  6.0 - 12.0 fL Final   • Platelets 06/14/2018 189  140 - 500 10*3/mm3 Final   • Neutrophil % 06/14/2018 64.6  42.7 - 76.0 % Final   • Lymphocyte % 06/14/2018 16.8* 19.6 - 45.3 % Final   • Monocyte % 06/14/2018 17.6* 5.0 - 12.0 % Final   • Eosinophil % 06/14/2018 0.6  0.3 - 6.2 % Final   • Basophil % 06/14/2018 0.2  0.0 - 1.5 % Final   • Immature Grans % 06/14/2018 0.2  0.0 - 0.5 % Final   • Neutrophils, Absolute 06/14/2018 5.63  1.90 - 8.10 10*3/mm3 Final   • Lymphocytes, Absolute 06/14/2018 1.47  0.90 - 4.80 10*3/mm3 Final   • Monocytes, Absolute 06/14/2018 1.54* 0.20 - 1.20 10*3/mm3 Final   • Eosinophils, Absolute 06/14/2018 0.05  0.00 - 0.70 10*3/mm3 Final   • Basophils, Absolute 06/14/2018 0.02  0.00 - 0.20 10*3/mm3 Final   • Immature Grans, Absolute 06/14/2018 0.02  0.00 - 0.03 10*3/mm3 Final       No results found.      ASSESSMENT:  Right lower extremity laceration to bone  Patient Active Problem List   Diagnosis   • Varicose vein of leg   • Cellulitis of right anterior lower leg - traumatic       PLAN:    Will get MRI Of Right tib/fib to assess for cellulitis/osteomyelitis. CRP  and Sed Rate. Will follow up after results. Dr. Aponte made aware.     The above diagnosis and treatment plan was discussed with the patient.  They were educated in treatment options for their condition.   They were given the opportunity to ask questions and were answered to their satisfaction.  They agreed to proceed with the above treatment plan.        Keily Tellez PA-C  Date: 6/14/2018

## 2018-06-14 NOTE — PAYOR COMM NOTE
"Melissa East (38 y.o. Male)     ATTN: NURSE REVIEW    REF#[id number]  PLEASE CALL BACK TO EDITH [phone] OR [phone]  THANKS!   EDITH      Date of Birth Social Security Number Address Home Phone MRN    1980  [id number] [id number] [phone] [id number]    Orthodox Marital Status          Lutheran        Admission Date Admission Type Admitting Provider Attending Provider Department, Room/Bed    18 Urgent Angus Patel MD Schmitt, Christopher E, MD [facility] [address] [place], P495/1    Discharge Date Discharge Disposition Discharge Destination                       Attending Provider:  Juan Carlisle MD    Allergies:  No Known Allergies    Isolation:  None   Infection:  None   Code Status:  FULL    Ht:  185.4 cm (73\")   Wt:  85.3 kg (188 lb)    Admission Cmt:  None   Principal Problem:  Cellulitis of right anterior lower leg - traumatic [L03.115]                 Active Insurance as of 2018     Primary Coverage     Payor Plan Insurance Group Employer/Plan Group    [facility] [place] [id number]     Payor Plan Address Payor Plan Phone Number Effective From Effective To    [address]  2014     [facility] [id number]       Subscriber Name Subscriber Birth Date Member ID       MELISSA EAST JR. 1980 [id number]                 Emergency Contacts      (Rel.) Home Phone Work Phone Mobile Phone    Veronica East (Spouse) -- -- [phone]               History & Physical      Angus Patel MD at 2018 11:10 PM          Internal medicine history and physical    INTERNAL MEDICINE   [facility]       Patient Identification:  Name: Melissa East Jr.  Age: 38 y.o.  Sex: male  :  1980  MRN: [id number]                 Primary Care Physician: Allen Jung MD                                   Chief Complaint:  Progressive pain and redness in the right leg after repair of " laceration on Monday night while on antibiotic therapy.    History of Present Illness:   Patient is a 38-year-old male who is otherwise in good health except for history of varicose vein was in his usual state of his health until Monday evening when walking with her daughter in the neighborhood he tripped on cryptic drainpipe and his neighbors yard.  He developed significant laceration of the right leg with bone exposed.  Patient was taken to [facility] emergency room where his laceration was irrigated and cleaned and repaired with suture.  Patient was given tetanus injection and was started on Keflex.  According the patient he woke up yesterday with some discomfort in his right leg but was able to carry out his routine activities and eventually did go to work.  As the work was progressing yesterday he was getting tired easily.  His leg was starting to throb and hurt.  He woke up this morning felt poorly and did not go to work and started noticing that the throbbing and discomfort in the right leg was getting worse with activity.  This resulted in him seeing his primary care physician Dr. Jung who happens to be his personal friend also.  Dr. Jung, after looking at his leg and noticing spreading redness both distally and proximally to the area of laceration and repair at [facility] despite being on antibiotic therapy, recommended hospitalization and IV antibiotic therapy and further evaluation of the right leg traumatic cellulitis to ensure that there is no deeper extension such as contiguous focus osteomyelitis or bruising need for I&D for trapped purulent material.        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   Home Meds:  Prescriptions Prior to Admission   Medication Sig Dispense Refill Last Dose   • Cephalexin 500 MG tablet Take 500 mg by mouth.   6/13/2018 at Unknown time   • ibuprofen (ADVIL) 200 MG tablet Take 200 mg by mouth Every 6 (Six) Hours As Needed for Mild Pain .   6/13/2018 at Unknown time   • meloxicam (MOBIC) 15 MG tablet Take 1 tablet by mouth Daily. 30 tablet 11 Past Week at Unknown time   • zolpidem (AMBIEN) 10 MG tablet Take 1 tablet by mouth At Night As Needed for Sleep. 30 tablet 2 Past Week at Unknown time   • cyclobenzaprine (FLEXERIL) 10 MG tablet Take 1 tablet by mouth 2 (Two) Times a Day As Needed for Muscle Spasms. 60 tablet 2 Unknown at Unknown time     Current Meds:     Current Facility-Administered Medications:   •  acetaminophen (TYLENOL) tablet 650 mg, 650 mg, Oral, Q6H PRN, Angus Patel MD, 650 mg at 06/13/18 2041  •  Pharmacy to dose vancomycin, , Does not apply, Continuous PRN, Angus Patel MD  •  Pharmacy to Dose Zosyn, , Does not apply, Continuous PRN, Angus Patel MD  •  [START ON 6/14/2018] piperacillin-tazobactam (ZOSYN) 3.375 g in iso-osmotic dextrose 50 ml (premix), 3.375 g, Intravenous, Q8H, Angus Patel MD  •  sodium chloride 0.9 % infusion, 100 mL/hr, Intravenous, Continuous, Angus Patel MD, Last Rate: 100 mL/hr at 06/13/18 1801, 100 mL/hr at 06/13/18 1801  •  [START ON 6/14/2018] vancomycin 1750 mg/500 mL 0.9% NS IVPB ([facility]), 1,750 mg, Intravenous, Q12H, Angus Patel MD  Allergies:  No Known Allergies  Social History:   Social History   Substance Use Topics   • Smoking status: Never Smoker   • Smokeless tobacco: Never Used   • Alcohol use No      Family History:  Family History   Problem Relation Age of Onset   • Alcohol abuse Other    • Arthritis Other    • Depression Other           Review of Systems  See history of present illness and past medical history.  Constitutional: Negative for activity  "change, chills, fatigue and fever.   Respiratory: Negative for cough and shortness of breath.    Cardiovascular: Negative for chest pain and palpitations.   Gastrointestinal: Negative for abdominal pain.   Endocrine: Negative for cold intolerance.   Skin: laceration   Psychiatric/Behavioral: Negative for behavioral problems and dysphoric mood. The patient is not nervous/anxious.    Vitals:   /64 (BP Location: Left arm, Patient Position: Sitting)   Pulse 90   Temp 100.5 °F (38.1 °C) (Oral)   Resp 18   Ht 185.4 cm (73\")   Wt 85.3 kg (188 lb)   SpO2 97%   BMI 24.80 kg/m²    I/O:   Intake/Output Summary (Last 24 hours) at 06/13/18 2310  Last data filed at 06/13/18 2226   Gross per 24 hour   Intake              360 ml   Output              450 ml   Net              -90 ml     Exam:  General Appearance:    Alert, cooperative, no distress, appears stated age   Head:    Normocephalic, without obvious abnormality, atraumatic   Eyes:    PERRL, conjunctiva/corneas clear, EOM's intact, both eyes   Ears:    Normal external ear canals, both ears   Nose:   Nares normal, septum midline, mucosa normal, no drainage    or sinus tenderness   Throat:   Lips, tongue, gums normal; oral mucosa pink and moist   Neck:   Supple, symmetrical, trachea midline, no adenopathy;     thyroid:  no enlargement/tenderness/nodules; no carotid    bruit or JVD   Back:     Symmetric, no curvature, ROM normal, no CVA tenderness   Lungs:     Clear to auscultation bilaterally, respirations unlabored   Chest Wall:    No tenderness or deformity    Heart:    Regular rate and rhythm, S1 and S2 normal, no murmur, rub   or gallop   Abdomen:     Soft, non-tender, bowel sounds active all four quadrants,     no masses, no hepatomegaly, no splenomegaly   Extremities:   Obvious laceration with repair and minimal oozing on the anterior aspect of the midportion of the right leg with spreading erythema extending all the way to the ankle and posterior ankle " joint and proximally extending to the tibial plateau .  No evidence of compartment syndrome and passive range of motion is intact with no significant worsening in pain and discomfort.  Patient has palpable pulse and intact sensory examination of the foot.     Pulses:   Pulses palpable in all extremities; symmetric all extremities   Skin:   Skin color normal, Skin is warm and dry,  no rashes or palpable lesions   Neurologic:   CNII-XII intact, motor strength grossly intact, sensation grossly intact to light touch, no focal deficits noted       Data Review:      I reviewed the patient's new clinical results.    Results from last 7 days  Lab Units 06/13/18  1755   WBC 10*3/mm3 12.43*   HEMOGLOBIN g/dL 14.2   PLATELETS 10*3/mm3 209       Results from last 7 days  Lab Units 06/13/18  1755   SODIUM mmol/L 142   POTASSIUM mmol/L 3.8   CHLORIDE mmol/L 102   CO2 mmol/L 30.1*   BUN mg/dL 16   CREATININE mg/dL 1.18   CALCIUM mg/dL 9.7   GLUCOSE mg/dL 94     Microbiology Results (last 10 days)     ** No results found for the last 240 hours. **          Assessment:  Active Hospital Problems (** Indicates Principal Problem)    Diagnosis Date Noted   • **Cellulitis of right anterior lower leg - traumatic [L03.115] 06/13/2018      Resolved Hospital Problems    Diagnosis Date Noted Date Resolved   No resolved problems to display.       Plan:  Traumatic cellulitis of the right leg with spreading cellulitis in the last day or so around 48 hours after initial suturing of the contaminated laceration with exposed bone.  Plan is to provide him with broad-spectrum antibiotic coverage to cover for the shadia that could have been implicated in this situation where his own shadia and the environmental shadia may have contributing to the infection.  We will get orthopedic surgery evaluation.  Probable contiguous focus osteomyelitis of the right tibia as bone was exposed at the time of laceration and now repaired areas appears to be inflamed with  spreading cellulitis.  We will get plain x-ray of the right tibia and deferred it to orthopedic surgery service if further imaging is needed to rule out this possibility    Angus Patel MD   6/13/2018  11:10 PM  Much of this encounter note is an electronic transcription/translation of spoken language to printed text. The electronic translation of spoken language may permit erroneous, or at times, nonsensical words or phrases to be inadvertently transcribed; Although I have reviewed the note for such errors, some may still exist      Electronically signed by Angus Patel MD at 6/14/2018  3:30 AM       Emergency Department Notes     No notes of this type exist for this encounter.        Hospital Medications (all)       Dose Frequency Start End    acetaminophen (TYLENOL) tablet 650 mg 650 mg Every 6 Hours PRN 6/13/2018     Sig - Route: Take 2 tablets by mouth Every 6 (Six) Hours As Needed for Mild Pain . - Oral    heparin (porcine) 5000 UNIT/ML injection 5,000 Units 5,000 Units Every 12 Hours Scheduled 6/13/2018     Sig - Route: Inject 1 mL under the skin Every 12 (Twelve) Hours. - Subcutaneous    HYDROcodone-acetaminophen (NORCO) 7.5-325 MG per tablet 1 tablet 1 tablet Every 4 Hours PRN 6/13/2018 6/23/2018    Sig - Route: Take 1 tablet by mouth Every 4 (Four) Hours As Needed for Moderate Pain . - Oral    ondansetron (ZOFRAN) injection 4 mg 4 mg Every 6 Hours PRN 6/13/2018     Sig - Route: Infuse 2 mL into a venous catheter Every 6 (Six) Hours As Needed for Nausea or Vomiting. - Intravenous    Pharmacy to dose vancomycin  Continuous PRN 6/13/2018 6/23/2018    Sig - Route: Continuous As Needed for Consult. - Does not apply    Pharmacy to Dose Zosyn  Continuous PRN 6/13/2018 6/23/2018    Sig - Route: Continuous As Needed for Consult. - Does not apply    piperacillin-tazobactam (ZOSYN) 3.375 g in iso-osmotic dextrose 50 ml (premix) 3.375 g Once 6/13/2018 6/13/2018    Sig - Route: Infuse 50 mL into a venous catheter 1  (One) Time. - Intravenous    piperacillin-tazobactam (ZOSYN) 3.375 g in iso-osmotic dextrose 50 ml (premix) 3.375 g Every 8 Hours 2018    Sig - Route: Infuse 50 mL into a venous catheter Every 8 (Eight) Hours. - Intravenous    sodium chloride 0.9 % flush 1-10 mL 1-10 mL As Needed 2018     Sig - Route: Infuse 1-10 mL into a venous catheter As Needed for Line Care. - Intravenous    sodium chloride 0.9 % infusion 50 mL/hr Continuous 2018     Sig - Route: Infuse 50 mL/hr into a venous catheter Continuous. - Intravenous    vancomycin 1750 mg/500 mL 0.9% NS IVPB ([facility]) 20 mg/kg × 90.3 kg Once 2018    Sig - Route: Infuse 500 mL into a venous catheter 1 (One) Time. - Intravenous    vancomycin 1750 mg/500 mL 0.9% NS IVPB ([facility]) 1,750 mg Every 12 Hours 2018    Sig - Route: Infuse 500 mL into a venous catheter Every 12 (Twelve) Hours. - Intravenous    zolpidem (AMBIEN) tablet 10 mg 10 mg Nightly PRN 2018     Sig - Route: Take 2 tablets by mouth At Night As Needed for Sleep. - Oral    Pharmacy to Dose Zosyn (Discontinued)  Continuous 2018    Sig - Route: Continuous. - Does not apply             Physician Progress Notes (last 72 hours) (Notes from 2018 11:36 AM through 2018 11:36 AM)      Juan Carlisle MD at 2018 10:47 AM            Name: Mario East   ADMIT: 2018   Age/Sex: 38 y.o.male LOS:  LOS: 1 day    :    1980     ROOM: [facility]   MRN:    [id number]    PCP:    Allen Jung MD     Subjective   Pain is fairly well controlled. No other issues.     Objective   Vital Signs  Temp:  [98 °F (36.7 °C)-100.5 °F (38.1 °C)] 98.7 °F (37.1 °C)  Heart Rate:  [67-90] 74  Resp:  [16-18] 18  BP: (111-129)/(60-76) 111/60  Body mass index is 24.8 kg/m².    Objective:  General Appearance:  Comfortable and well-appearing.    Vital signs: (most recent): Blood pressure 111/60, pulse 74, temperature 98.7 °F (37.1 °C), temperature  "source Oral, resp. rate 18, height 185.4 cm (73\"), weight 85.3 kg (188 lb), SpO2 99 %.  Vital signs are normal.    HEENT: Normal HEENT exam.    Lungs:  Normal effort.  Breath sounds clear to auscultation.    Heart: Normal rate.  Regular rhythm.    Abdomen: Abdomen is soft and non-distended.  Bowel sounds are normal.   There is no abdominal tenderness.     Extremities: There is no dependent edema.  (Large wound on anterior RLE shin with sutures in place, surrounding cellulitis)  Neurological: Patient is alert and oriented to person, place and time.    Skin:  Warm and dry.  No rash.             Results Review:       I reviewed the patient's new clinical results.    Results from last 7 days  Lab Units 06/14/18  0649 06/13/18  1755   WBC 10*3/mm3 8.73 12.43*   HEMOGLOBIN g/dL 13.4* 14.2   PLATELETS 10*3/mm3 189 209       Results from last 7 days  Lab Units 06/14/18  0649 06/13/18  1755   SODIUM mmol/L 141 142   POTASSIUM mmol/L 3.8 3.8   CHLORIDE mmol/L 103 102   CO2 mmol/L 26.5 30.1*   BUN mg/dL 11 16   CREATININE mg/dL 1.11 1.18   GLUCOSE mg/dL 104* 94   Estimated Creatinine Clearance: 107.8 mL/min (by C-G formula based on SCr of 1.11 mg/dL).    Results from last 7 days  Lab Units 06/14/18  0649 06/13/18  1755   CALCIUM mg/dL 9.3 9.7   ALBUMIN g/dL 3.90 4.60       RADIOLOGY  6/14/2018  Pending      heparin (porcine) 5,000 Units Subcutaneous Q12H   piperacillin-tazobactam 3.375 g Intravenous Q8H   vancomycin 1,750 mg Intravenous Q12H       Pharmacy to dose vancomycin     Pharmacy to Dose Zosyn     sodium chloride 50 mL/hr Last Rate: 100 mL/hr (06/14/18 1018)   Diet Regular      Assessment/Plan   Assessment:   Principal Problem:    Cellulitis of right anterior lower leg - traumatic        Plan:   RLE Cellulitis  - cont vanc and zosyn  - reduce IVF rate  - xray results reviewed, going down for MRI now  - Ortho consulted  - cont pain control  - BCx sent    Leukocytosis  - resolved currently, will check inflammatory " markers this am    Elevated AST/ALT and Alk phos  - check hep panel, check coags  - may need to limit tylenol  - recheck in am, consider imaging if still trending up    Disposition  TBD.      Juan Carlisle MD  [facility]  06/14/18  10:54 AM    Electronically signed by Juan Carlisle MD at 6/14/2018 10:55 AM       Consult Notes (last 72 hours) (Notes from 6/11/2018 11:36 AM through 6/14/2018 11:36 AM)     No notes of this type exist for this encounter.

## 2018-06-14 NOTE — H&P
Internal medicine history and physical    INTERNAL MEDICINE   [facility]       Patient Identification:  Name: Mario East Jr.  Age: 38 y.o.  Sex: male  :  1980  MRN: [id number]                 Primary Care Physician: Allen Jung MD                                   Chief Complaint:  Progressive pain and redness in the right leg after repair of laceration on Monday night while on antibiotic therapy.    History of Present Illness:   Patient is a 38-year-old male who is otherwise in good health except for history of varicose vein was in his usual state of his health until Monday evening when walking with her daughter in the neighborhood he tripped on cryptic drainpipe and his neighbors yard.  He developed significant laceration of the right leg with bone exposed.  Patient was taken to [facility] emergency room where his laceration was irrigated and cleaned and repaired with suture.  Patient was given tetanus injection and was started on Keflex.  According the patient he woke up yesterday with some discomfort in his right leg but was able to carry out his routine activities and eventually did go to work.  As the work was progressing yesterday he was getting tired easily.  His leg was starting to throb and hurt.  He woke up this morning felt poorly and did not go to work and started noticing that the throbbing and discomfort in the right leg was getting worse with activity.  This resulted in him seeing his primary care physician Dr. Jung who happens to be his personal friend also.  Dr. Jung, after looking at his leg and noticing spreading redness both distally and proximally to the area of laceration and repair at [facility] despite being on antibiotic therapy, recommended hospitalization and IV antibiotic therapy and further evaluation of the right leg traumatic cellulitis to ensure that there is no deeper extension such as contiguous focus osteomyelitis or  bruising need for I&D for trapped purulent material.        Past Medical History:  Past Medical History:   Diagnosis Date   • H/O chest x-ray 01/05/2011    NORMAL   • History of EKG 01/01/2011    NORMAL   • Hx of echocardiogram 01/01/2011    NORMAL CARDIAC ANATOMY PT FORAMEN OVALE (LEFT TO RIGHT SHUNT) GOOD BI VENTRICULAR SYSTOLIC FUNCTION , TRACE TRICUSPID PULMONARY AND MITRAL INSUFFIENCY      Past Surgical History:  No past surgical history on file.   Home Meds:  Prescriptions Prior to Admission   Medication Sig Dispense Refill Last Dose   • Cephalexin 500 MG tablet Take 500 mg by mouth.   6/13/2018 at Unknown time   • ibuprofen (ADVIL) 200 MG tablet Take 200 mg by mouth Every 6 (Six) Hours As Needed for Mild Pain .   6/13/2018 at Unknown time   • meloxicam (MOBIC) 15 MG tablet Take 1 tablet by mouth Daily. 30 tablet 11 Past Week at Unknown time   • zolpidem (AMBIEN) 10 MG tablet Take 1 tablet by mouth At Night As Needed for Sleep. 30 tablet 2 Past Week at Unknown time   • cyclobenzaprine (FLEXERIL) 10 MG tablet Take 1 tablet by mouth 2 (Two) Times a Day As Needed for Muscle Spasms. 60 tablet 2 Unknown at Unknown time     Current Meds:     Current Facility-Administered Medications:   •  acetaminophen (TYLENOL) tablet 650 mg, 650 mg, Oral, Q6H PRN, Angus Patel MD, 650 mg at 06/13/18 2041  •  Pharmacy to dose vancomycin, , Does not apply, Continuous PRN, Angus Patel MD  •  Pharmacy to Dose Zosyn, , Does not apply, Continuous PRN, Angus Patel MD  •  [START ON 6/14/2018] piperacillin-tazobactam (ZOSYN) 3.375 g in iso-osmotic dextrose 50 ml (premix), 3.375 g, Intravenous, Q8H, Angus Patel MD  •  sodium chloride 0.9 % infusion, 100 mL/hr, Intravenous, Continuous, Angus Patel MD, Last Rate: 100 mL/hr at 06/13/18 1801, 100 mL/hr at 06/13/18 1801  •  [START ON 6/14/2018] vancomycin 1750 mg/500 mL 0.9% NS IVPB ([facility]), 1,750 mg, Intravenous, Q12H, Angus Patel MD  Allergies:  No Known Allergies  Social History:   Social  "History   Substance Use Topics   • Smoking status: Never Smoker   • Smokeless tobacco: Never Used   • Alcohol use No      Family History:  Family History   Problem Relation Age of Onset   • Alcohol abuse Other    • Arthritis Other    • Depression Other           Review of Systems  See history of present illness and past medical history.  Constitutional: Negative for activity change, chills, fatigue and fever.   Respiratory: Negative for cough and shortness of breath.    Cardiovascular: Negative for chest pain and palpitations.   Gastrointestinal: Negative for abdominal pain.   Endocrine: Negative for cold intolerance.   Skin: laceration   Psychiatric/Behavioral: Negative for behavioral problems and dysphoric mood. The patient is not nervous/anxious.    Vitals:   /64 (BP Location: Left arm, Patient Position: Sitting)   Pulse 90   Temp 100.5 °F (38.1 °C) (Oral)   Resp 18   Ht 185.4 cm (73\")   Wt 85.3 kg (188 lb)   SpO2 97%   BMI 24.80 kg/m²   I/O:   Intake/Output Summary (Last 24 hours) at 06/13/18 2310  Last data filed at 06/13/18 2226   Gross per 24 hour   Intake              360 ml   Output              450 ml   Net              -90 ml     Exam:  General Appearance:    Alert, cooperative, no distress, appears stated age   Head:    Normocephalic, without obvious abnormality, atraumatic   Eyes:    PERRL, conjunctiva/corneas clear, EOM's intact, both eyes   Ears:    Normal external ear canals, both ears   Nose:   Nares normal, septum midline, mucosa normal, no drainage    or sinus tenderness   Throat:   Lips, tongue, gums normal; oral mucosa pink and moist   Neck:   Supple, symmetrical, trachea midline, no adenopathy;     thyroid:  no enlargement/tenderness/nodules; no carotid    bruit or JVD   Back:     Symmetric, no curvature, ROM normal, no CVA tenderness   Lungs:     Clear to auscultation bilaterally, respirations unlabored   Chest Wall:    No tenderness or deformity    Heart:    Regular rate and " rhythm, S1 and S2 normal, no murmur, rub   or gallop   Abdomen:     Soft, non-tender, bowel sounds active all four quadrants,     no masses, no hepatomegaly, no splenomegaly   Extremities:   Obvious laceration with repair and minimal oozing on the anterior aspect of the midportion of the right leg with spreading erythema extending all the way to the ankle and posterior ankle joint and proximally extending to the tibial plateau .  No evidence of compartment syndrome and passive range of motion is intact with no significant worsening in pain and discomfort.  Patient has palpable pulse and intact sensory examination of the foot.     Pulses:   Pulses palpable in all extremities; symmetric all extremities   Skin:   Skin color normal, Skin is warm and dry,  no rashes or palpable lesions   Neurologic:   CNII-XII intact, motor strength grossly intact, sensation grossly intact to light touch, no focal deficits noted       Data Review:      I reviewed the patient's new clinical results.    Results from last 7 days  Lab Units 06/13/18  1755   WBC 10*3/mm3 12.43*   HEMOGLOBIN g/dL 14.2   PLATELETS 10*3/mm3 209       Results from last 7 days  Lab Units 06/13/18  1755   SODIUM mmol/L 142   POTASSIUM mmol/L 3.8   CHLORIDE mmol/L 102   CO2 mmol/L 30.1*   BUN mg/dL 16   CREATININE mg/dL 1.18   CALCIUM mg/dL 9.7   GLUCOSE mg/dL 94     Microbiology Results (last 10 days)     ** No results found for the last 240 hours. **          Assessment:  Active Hospital Problems (** Indicates Principal Problem)    Diagnosis Date Noted   • **Cellulitis of right anterior lower leg - traumatic [L03.115] 06/13/2018      Resolved Hospital Problems    Diagnosis Date Noted Date Resolved   No resolved problems to display.       Plan:  Traumatic cellulitis of the right leg with spreading cellulitis in the last day or so around 48 hours after initial suturing of the contaminated laceration with exposed bone.  Plan is to provide him with broad-spectrum  antibiotic coverage to cover for the shadia that could have been implicated in this situation where his own shadia and the environmental shadia may have contributing to the infection.  We will get orthopedic surgery evaluation.  Probable contiguous focus osteomyelitis of the right tibia as bone was exposed at the time of laceration and now repaired areas appears to be inflamed with spreading cellulitis.  We will get plain x-ray of the right tibia and deferred it to orthopedic surgery service if further imaging is needed to rule out this possibility    Agnus Patel MD   6/13/2018  11:10 PM  Much of this encounter note is an electronic transcription/translation of spoken language to printed text. The electronic translation of spoken language may permit erroneous, or at times, nonsensical words or phrases to be inadvertently transcribed; Although I have reviewed the note for such errors, some may still exist

## 2018-06-14 NOTE — PLAN OF CARE
Problem: Patient Care Overview  Goal: Plan of Care Review  Outcome: Ongoing (interventions implemented as appropriate)   06/14/18 8616   Coping/Psychosocial   Plan of Care Reviewed With patient;spouse   Plan of Care Review   Progress improving   OTHER   Outcome Summary Continued IV abx and IVF. Pain controlled with tylenol. XR and MRI completed, still waiting to hear from ortho to decide plan. Wife at bedside, no needs/complaints. Will continue to monitor.        Problem: Pain, Acute (Adult)  Goal: Acceptable Pain Control/Comfort Level  Outcome: Ongoing (interventions implemented as appropriate)      Problem: Infection, Risk/Actual (Adult)  Goal: Infection Prevention/Resolution  Outcome: Ongoing (interventions implemented as appropriate)

## 2018-06-15 VITALS
RESPIRATION RATE: 16 BRPM | SYSTOLIC BLOOD PRESSURE: 122 MMHG | HEART RATE: 58 BPM | TEMPERATURE: 97.6 F | WEIGHT: 188 LBS | DIASTOLIC BLOOD PRESSURE: 65 MMHG | OXYGEN SATURATION: 99 % | BODY MASS INDEX: 24.92 KG/M2 | HEIGHT: 73 IN

## 2018-06-15 DIAGNOSIS — R74.8 ELEVATED LIVER ENZYMES: Primary | ICD-10-CM

## 2018-06-15 LAB
ALBUMIN SERPL-MCNC: 3.8 G/DL (ref 3.5–5.2)
ALBUMIN/GLOB SERPL: 1.5 G/DL
ALP SERPL-CCNC: 169 U/L (ref 39–117)
ALT SERPL W P-5'-P-CCNC: 214 U/L (ref 1–41)
ANION GAP SERPL CALCULATED.3IONS-SCNC: 11 MMOL/L
AST SERPL-CCNC: 104 U/L (ref 1–40)
BASOPHILS # BLD AUTO: 0.01 10*3/MM3 (ref 0–0.2)
BASOPHILS NFR BLD AUTO: 0.1 % (ref 0–1.5)
BILIRUB SERPL-MCNC: 0.5 MG/DL (ref 0.1–1.2)
BUN BLD-MCNC: 10 MG/DL (ref 6–20)
BUN/CREAT SERPL: 10.4 (ref 7–25)
CALCIUM SPEC-SCNC: 9.5 MG/DL (ref 8.6–10.5)
CHLORIDE SERPL-SCNC: 106 MMOL/L (ref 98–107)
CO2 SERPL-SCNC: 27 MMOL/L (ref 22–29)
CREAT BLD-MCNC: 0.96 MG/DL (ref 0.76–1.27)
DEPRECATED RDW RBC AUTO: 43.4 FL (ref 37–54)
EOSINOPHIL # BLD AUTO: 0.11 10*3/MM3 (ref 0–0.7)
EOSINOPHIL NFR BLD AUTO: 1.6 % (ref 0.3–6.2)
ERYTHROCYTE [DISTWIDTH] IN BLOOD BY AUTOMATED COUNT: 12.8 % (ref 11.5–14.5)
GFR SERPL CREATININE-BSD FRML MDRD: 87 ML/MIN/1.73
GLOBULIN UR ELPH-MCNC: 2.6 GM/DL
GLUCOSE BLD-MCNC: 96 MG/DL (ref 65–99)
HAV IGM SERPL QL IA: NORMAL
HBV CORE IGM SERPL QL IA: NORMAL
HBV SURFACE AG SERPL QL IA: NORMAL
HCT VFR BLD AUTO: 39.7 % (ref 40.4–52.2)
HCV AB SER DONR QL: NORMAL
HGB BLD-MCNC: 12.9 G/DL (ref 13.7–17.6)
IMM GRANULOCYTES # BLD: 0 10*3/MM3 (ref 0–0.03)
IMM GRANULOCYTES NFR BLD: 0 % (ref 0–0.5)
INR PPP: 1.09 (ref 0.9–1.1)
LYMPHOCYTES # BLD AUTO: 1.44 10*3/MM3 (ref 0.9–4.8)
LYMPHOCYTES NFR BLD AUTO: 21.1 % (ref 19.6–45.3)
MCH RBC QN AUTO: 30.6 PG (ref 27–32.7)
MCHC RBC AUTO-ENTMCNC: 32.5 G/DL (ref 32.6–36.4)
MCV RBC AUTO: 94.1 FL (ref 79.8–96.2)
MONOCYTES # BLD AUTO: 0.87 10*3/MM3 (ref 0.2–1.2)
MONOCYTES NFR BLD AUTO: 12.7 % (ref 5–12)
NEUTROPHILS # BLD AUTO: 4.4 10*3/MM3 (ref 1.9–8.1)
NEUTROPHILS NFR BLD AUTO: 64.5 % (ref 42.7–76)
PLATELET # BLD AUTO: 197 10*3/MM3 (ref 140–500)
PMV BLD AUTO: 9.7 FL (ref 6–12)
POTASSIUM BLD-SCNC: 4 MMOL/L (ref 3.5–5.2)
PROT SERPL-MCNC: 6.4 G/DL (ref 6–8.5)
PROTHROMBIN TIME: 13.9 SECONDS (ref 11.7–14.2)
RBC # BLD AUTO: 4.22 10*6/MM3 (ref 4.6–6)
SODIUM BLD-SCNC: 144 MMOL/L (ref 136–145)
VANCOMYCIN TROUGH SERPL-MCNC: 11.1 MCG/ML (ref 5–20)
WBC NRBC COR # BLD: 6.83 10*3/MM3 (ref 4.5–10.7)

## 2018-06-15 PROCEDURE — 80202 ASSAY OF VANCOMYCIN: CPT | Performed by: INTERNAL MEDICINE

## 2018-06-15 PROCEDURE — 85025 COMPLETE CBC W/AUTO DIFF WBC: CPT | Performed by: INTERNAL MEDICINE

## 2018-06-15 PROCEDURE — 85610 PROTHROMBIN TIME: CPT | Performed by: INTERNAL MEDICINE

## 2018-06-15 PROCEDURE — 25010000002 VANCOMYCIN 10 G RECONSTITUTED SOLUTION: Performed by: INTERNAL MEDICINE

## 2018-06-15 PROCEDURE — 25010000002 HEPARIN (PORCINE) PER 1000 UNITS: Performed by: INTERNAL MEDICINE

## 2018-06-15 PROCEDURE — 80074 ACUTE HEPATITIS PANEL: CPT | Performed by: INTERNAL MEDICINE

## 2018-06-15 PROCEDURE — 25010000002 PIPERACILLIN SOD-TAZOBACTAM PER 1 G: Performed by: INTERNAL MEDICINE

## 2018-06-15 PROCEDURE — 80053 COMPREHEN METABOLIC PANEL: CPT | Performed by: INTERNAL MEDICINE

## 2018-06-15 RX ORDER — CLINDAMYCIN HYDROCHLORIDE 150 MG/1
450 CAPSULE ORAL EVERY 8 HOURS SCHEDULED
Qty: 90 CAPSULE | Refills: 0 | Status: SHIPPED | OUTPATIENT
Start: 2018-06-15 | End: 2018-06-25

## 2018-06-15 RX ADMIN — VANCOMYCIN HYDROCHLORIDE 1750 MG: 10 INJECTION, POWDER, LYOPHILIZED, FOR SOLUTION INTRAVENOUS at 05:33

## 2018-06-15 RX ADMIN — ACETAMINOPHEN 650 MG: 325 TABLET ORAL at 05:33

## 2018-06-15 RX ADMIN — TAZOBACTAM SODIUM AND PIPERACILLIN SODIUM 3.38 G: 375; 3 INJECTION, SOLUTION INTRAVENOUS at 09:16

## 2018-06-15 RX ADMIN — TAZOBACTAM SODIUM AND PIPERACILLIN SODIUM 3.38 G: 375; 3 INJECTION, SOLUTION INTRAVENOUS at 01:31

## 2018-06-15 RX ADMIN — HEPARIN SODIUM 5000 UNITS: 5000 INJECTION, SOLUTION INTRAVENOUS; SUBCUTANEOUS at 09:17

## 2018-06-15 NOTE — DISCHARGE SUMMARY
Date of Admission: 6/13/2018  Date of Discharge:  6/15/2018    PCP: Allen Jung MD      DISCHARGE DIAGNOSIS  Principal Problem:    Cellulitis of right anterior lower leg - traumatic      SECONDARY DIAGNOSES  Past Medical History:   Diagnosis Date   • H/O chest x-ray 01/05/2011    NORMAL   • History of EKG 01/01/2011    NORMAL   • Hx of echocardiogram 01/01/2011    NORMAL CARDIAC ANATOMY PT FORAMEN OVALE (LEFT TO RIGHT SHUNT) GOOD BI VENTRICULAR SYSTOLIC FUNCTION , TRACE TRICUSPID PULMONARY AND MITRAL INSUFFIENCY        CONSULTS   Consults     Date and Time Order Name Status Description    6/13/2018 2314 Inpatient Orthopedic Surgery Consult Completed           PROCEDURES PERFORMED  MRI R Tibia/Fibula:  Edema within the subcutaneous fat of the lower leg may be associated with trauma and/or cellulitis. There is also edema within the  anterior aspect of the tibialis anterior muscle, posttraumatic and potential localized myositis. No evidence for abscess or fluid collection. No tibial fracture or osteomyelitis.    HOSPITAL COURSE  Patient is a 38 y.o. male presented to [facility] complaining of redness and pain surrounding prior wound that had been sutured.  Please see the admitting history and physical for further details. Patient was started on IV Vancomycin and Zosyn as he had not been responding to Keflex as outpatient. MRI did not demonstrate any tibia fracture or osteomyelitis. His cellulitis improved dramatically with the above antibiotics. He will be transitioned to oral Clindamycin and will complete a total of 10 days of therapy with this due to the infection extending down to the musculature on MRI. He will follow up with wound care and his PCP for further management. Dr abad saw him and felt no surgical intervention was warranted and cleared him for discharge.     He did have AST/ALT of 110/137 on admission. His pt-INR was normal. He had been taking a lot of tylenol prior to arrival  "and was given tylenol here as well plus Zosyn which can elevate LFTs. Hep panel was negative. He had no abdominal pain. AST/ALT were 104/214 on the day of discharge. He was advised to not take any more tylenol containing products and to follow up with his PCP early next week to have this rechecked.        CONDITION ON DISCHARGE  Stable.      VITAL SIGNS  /65 (BP Location: Right arm, Patient Position: Lying)   Pulse 58   Temp 97.6 °F (36.4 °C) (Oral)   Resp 16   Ht 185.4 cm (73\")   Wt 85.3 kg (188 lb)   SpO2 99%   BMI 24.80 kg/m²   Objective:  General Appearance:  Comfortable and well-appearing.    Vital signs: (most recent): Blood pressure 122/65, pulse 58, temperature 97.6 °F (36.4 °C), temperature source Oral, resp. rate 16, height 185.4 cm (73\"), weight 85.3 kg (188 lb), SpO2 99 %.  Vital signs are normal.    HEENT: Normal HEENT exam.    Lungs:  Normal effort.  Breath sounds clear to auscultation.    Heart: Normal rate.  Regular rhythm.    Abdomen: Abdomen is soft and non-distended.  Bowel sounds are normal.   There is no abdominal tenderness.     Extremities: There is no dependent edema.  (Wound sutured, cellulitis improved)  Neurological: Patient is alert and oriented to person, place and time.    Skin:  Warm and dry.  No rash.               DISCHARGE DISPOSITION   Home or Self Care      DISCHARGE MEDICATIONS     Discharge Medications      New Medications      Instructions Start Date   clindamycin 150 MG capsule  Commonly known as:  CLEOCIN   450 mg, Oral, Every 8 Hours Scheduled         Continue These Medications      Instructions Start Date   ADVIL 200 MG tablet  Generic drug:  ibuprofen   200 mg, Oral, Every 6 Hours PRN      cyclobenzaprine 10 MG tablet  Commonly known as:  FLEXERIL   10 mg, Oral, 2 Times Daily PRN      meloxicam 15 MG tablet  Commonly known as:  MOBIC   15 mg, Oral, Daily      zolpidem 10 MG tablet  Commonly known as:  AMBIEN   10 mg, Oral, Nightly PRN         Stop These " Medications    Cephalexin 500 MG tablet           Future Appointments  Date Time Provider Department Center   6/21/2018 8:30 AM [facility] Y[id number] None     Follow-up Information     Allen Jung MD .    Specialty:  Family Medicine  Contact information:  [id number] [id number] [phone]                   TEST  RESULTS PENDING AT DISCHARGE   Order Current Status    Blood Culture - Blood, Preliminary result    Blood Culture - Blood, Preliminary result             Juan Carlisle MD  [facility]  06/15/18  9:51 AM      Time: greater than 30 minutes.

## 2018-06-15 NOTE — PLAN OF CARE
Problem: Patient Care Overview  Goal: Plan of Care Review  Continue symptom management and comfort care   06/14/18 1748 06/15/18 0425   Coping/Psychosocial   Plan of Care Reviewed With patient;spouse --    Plan of Care Review   Progress improving --    OTHER   Outcome Summary --  Patient rested well overnight. Continues to complain of headache. Tylenol given twice.

## 2018-06-15 NOTE — PROGRESS NOTES
DATE OF ADMISSION: 6/13/2018  4:01 PM     LOS: 2 days     Subjective :   Feeling better.    Objective :    Vital signs in last 24 hours:  Vitals:    06/14/18 1654 06/14/18 2145 06/15/18 0206 06/15/18 0536   BP: 115/65 120/76 105/50 122/65   BP Location: Left arm Left arm Right arm Right arm   Patient Position: Lying Lying Lying Lying   Pulse: 69 66 63 58   Resp: 16 16 14 16   Temp: 98.3 °F (36.8 °C) 98.6 °F (37 °C) 97.9 °F (36.6 °C) 97.6 °F (36.4 °C)   TempSrc: Oral Oral Oral Oral   SpO2: 99% 100% 98% 99%   Weight:       Height:         Body mass index is 24.8 kg/m².    PHYSICAL EXAM:  Patient is calm, in no acute distress, awake and oriented x 3.  Skin on anterior shin shows decreased erythema lacerating line is clean, dry and intact.  Swelling is appropriate in amount.  Homans test is negative.  Patient is neurovascularly intact distally.  Tibialis anterior is intact.      LABS:    Results from last 7 days  Lab Units 06/15/18  0534   WBC 10*3/mm3 6.83   HEMOGLOBIN g/dL 12.9*   HEMATOCRIT % 39.7*   PLATELETS 10*3/mm3 197       Results from last 7 days  Lab Units 06/15/18  0534   SODIUM mmol/L 144   POTASSIUM mmol/L 4.0   CHLORIDE mmol/L 106   CO2 mmol/L 27.0   BUN mg/dL 10   CREATININE mg/dL 0.96   GLUCOSE mg/dL 96   CALCIUM mg/dL 9.5       Results from last 7 days  Lab Units 06/15/18  0533   INR  1.09       ASSESSMENT:  Laceration with cellulitis, No bone involvement, no abscess    Plan:  He appears to be improving with IV abx.  Should be ok to d/c home with oral abx.  Will defer to Dr. Patel on abx choice.    He already has follow-up outpatient appointments for laceration care.  Will see him prn.  Call if questions    Pavan Aponte MD    Date: 6/15/2018  Time: 7:41 AM

## 2018-06-18 LAB
BACTERIA SPEC AEROBE CULT: NORMAL
BACTERIA SPEC AEROBE CULT: NORMAL

## 2018-08-10 LAB
ALBUMIN SERPL-MCNC: 4.9 G/DL (ref 3.5–5.5)
ALBUMIN/GLOB SERPL: 2.5 {RATIO} (ref 1.2–2.2)
ALP SERPL-CCNC: 88 IU/L (ref 39–117)
ALT SERPL-CCNC: 76 IU/L (ref 0–44)
AST SERPL-CCNC: 41 IU/L (ref 0–40)
BASOPHILS # BLD AUTO: 0 X10E3/UL (ref 0–0.2)
BASOPHILS NFR BLD AUTO: 1 %
BILIRUB SERPL-MCNC: 0.5 MG/DL (ref 0–1.2)
BUN SERPL-MCNC: 24 MG/DL (ref 6–20)
BUN/CREAT SERPL: 22 (ref 9–20)
CALCIUM SERPL-MCNC: 9.9 MG/DL (ref 8.7–10.2)
CHLORIDE SERPL-SCNC: 102 MMOL/L (ref 96–106)
CHOLEST SERPL-MCNC: 202 MG/DL (ref 100–199)
CO2 SERPL-SCNC: 24 MMOL/L (ref 20–29)
CREAT SERPL-MCNC: 1.09 MG/DL (ref 0.76–1.27)
EOSINOPHIL # BLD AUTO: 0 X10E3/UL (ref 0–0.4)
EOSINOPHIL NFR BLD AUTO: 1 %
ERYTHROCYTE [DISTWIDTH] IN BLOOD BY AUTOMATED COUNT: 13.3 % (ref 12.3–15.4)
GLOBULIN SER CALC-MCNC: 2 G/DL (ref 1.5–4.5)
GLUCOSE SERPL-MCNC: 87 MG/DL (ref 65–99)
HCT VFR BLD AUTO: 41.1 % (ref 37.5–51)
HDLC SERPL-MCNC: 50 MG/DL
HGB BLD-MCNC: 14.2 G/DL (ref 13–17.7)
IMM GRANULOCYTES # BLD: 0 X10E3/UL (ref 0–0.1)
IMM GRANULOCYTES NFR BLD: 0 %
LDLC SERPL CALC-MCNC: 133 MG/DL (ref 0–99)
LYMPHOCYTES # BLD AUTO: 1.3 X10E3/UL (ref 0.7–3.1)
LYMPHOCYTES NFR BLD AUTO: 31 %
MCH RBC QN AUTO: 31.2 PG (ref 26.6–33)
MCHC RBC AUTO-ENTMCNC: 34.5 G/DL (ref 31.5–35.7)
MCV RBC AUTO: 90 FL (ref 79–97)
MONOCYTES # BLD AUTO: 0.4 X10E3/UL (ref 0.1–0.9)
MONOCYTES NFR BLD AUTO: 9 %
NEUTROPHILS # BLD AUTO: 2.4 X10E3/UL (ref 1.4–7)
NEUTROPHILS NFR BLD AUTO: 58 %
PLATELET # BLD AUTO: 203 X10E3/UL (ref 150–379)
POTASSIUM SERPL-SCNC: 4.6 MMOL/L (ref 3.5–5.2)
PROT SERPL-MCNC: 6.9 G/DL (ref 6–8.5)
RBC # BLD AUTO: 4.55 X10E6/UL (ref 4.14–5.8)
SODIUM SERPL-SCNC: 143 MMOL/L (ref 134–144)
TRIGL SERPL-MCNC: 95 MG/DL (ref 0–149)
TSH SERPL DL<=0.005 MIU/L-ACNC: 1.02 UIU/ML (ref 0.45–4.5)
VLDLC SERPL CALC-MCNC: 19 MG/DL (ref 5–40)
WBC # BLD AUTO: 4.2 X10E3/UL (ref 3.4–10.8)

## 2018-08-12 LAB
TESTOST FREE SERPL-MCNC: 10.6 PG/ML (ref 8.7–25.1)
TESTOST SERPL-MCNC: 562 NG/DL (ref 264–916)

## 2018-11-08 PROBLEM — M54.9 BACK PAIN: Status: ACTIVE | Noted: 2018-11-08

## 2018-11-08 PROBLEM — T81.89XD NONHEALING SURGICAL WOUND, SUBSEQUENT ENCOUNTER: Status: ACTIVE | Noted: 2018-06-19

## 2018-11-08 PROBLEM — S81.801A TRAUMATIC OPEN WOUND OF RIGHT LOWER LEG: Status: ACTIVE | Noted: 2018-06-18

## 2018-11-08 PROBLEM — L03.115 CELLULITIS OF RIGHT LEG WITHOUT FOOT: Status: ACTIVE | Noted: 2018-06-18

## 2018-11-12 ENCOUNTER — OFFICE VISIT (OUTPATIENT)
Dept: FAMILY MEDICINE CLINIC | Facility: CLINIC | Age: 38
End: 2018-11-12

## 2018-11-12 VITALS
SYSTOLIC BLOOD PRESSURE: 113 MMHG | TEMPERATURE: 98.2 F | DIASTOLIC BLOOD PRESSURE: 71 MMHG | HEIGHT: 73 IN | HEART RATE: 60 BPM | RESPIRATION RATE: 14 BRPM | BODY MASS INDEX: 26.37 KG/M2 | WEIGHT: 199 LBS

## 2018-11-12 DIAGNOSIS — Z23 IMMUNIZATION DUE: ICD-10-CM

## 2018-11-12 DIAGNOSIS — G47.26 SHIFT WORK SLEEP DISORDER: Primary | ICD-10-CM

## 2018-11-12 PROCEDURE — 99213 OFFICE O/P EST LOW 20 MIN: CPT | Performed by: FAMILY MEDICINE

## 2018-11-12 PROCEDURE — 90632 HEPA VACCINE ADULT IM: CPT | Performed by: FAMILY MEDICINE

## 2018-11-12 PROCEDURE — 90471 IMMUNIZATION ADMIN: CPT | Performed by: FAMILY MEDICINE

## 2018-11-12 RX ORDER — MODAFINIL 100 MG/1
100 TABLET ORAL DAILY
Qty: 30 TABLET | Refills: 2 | Status: SHIPPED | OUTPATIENT
Start: 2018-11-12 | End: 2020-07-28 | Stop reason: SDUPTHER

## 2018-11-12 NOTE — PROGRESS NOTES
Subjective   Mario East Jr. is a 38 y.o. male.     CC: Management of RLE Pain         ADHD    History of Present Illness     Pt returns today after being seen here 6/13/18 and admitted to the hospital due to infection. That visit was as follows:    Date of Admission: 6/13/2018  Date of Discharge:  6/15/2018     PCP: Allen Jung MD        DISCHARGE DIAGNOSIS  Principal Problem:    Cellulitis of right anterior lower leg - traumatic        SECONDARY DIAGNOSES  Medical History        Past Medical History:   Diagnosis Date   • H/O chest x-ray 01/05/2011     NORMAL   • History of EKG 01/01/2011     NORMAL   • Hx of echocardiogram 01/01/2011     NORMAL CARDIAC ANATOMY PT FORAMEN OVALE (LEFT TO RIGHT SHUNT) GOOD BI VENTRICULAR SYSTOLIC FUNCTION , TRACE TRICUSPID PULMONARY AND MITRAL INSUFFIENCY             CONSULTS          Consults      Date and Time Order Name Status Description     6/13/2018 1222 Inpatient Orthopedic Surgery Consult Completed               PROCEDURES PERFORMED  MRI R Tibia/Fibula:  Edema within the subcutaneous fat of the lower leg may be associated with trauma and/or cellulitis. There is also edema within the  anterior aspect of the tibialis anterior muscle, posttraumatic and potential localized myositis. No evidence for abscess or fluid collection. No tibial fracture or osteomyelitis.     HOSPITAL COURSE  Patient is a 38 y.o. male presented to [facility] complaining of redness and pain surrounding prior wound that had been sutured.  Please see the admitting history and physical for further details. Patient was started on IV Vancomycin and Zosyn as he had not been responding to Keflex as outpatient. MRI did not demonstrate any tibia fracture or osteomyelitis. His cellulitis improved dramatically with the above antibiotics. He will be transitioned to oral Clindamycin and will complete a total of 10 days of therapy with this due to the infection extending down to the musculature  on MRI. He will follow up with wound care and his PCP for further management. Dr abad saw him and felt no surgical intervention was warranted and cleared him for discharge.      He did have AST/ALT of 110/137 on admission. His pt-INR was normal. He had been taking a lot of tylenol prior to arrival and was given tylenol here as well plus Zosyn which can elevate LFTs. Hep panel was negative. He had no abdominal pain. AST/ALT were 104/214 on the day of discharge. He was advised to not take any more tylenol containing products and to follow up with his PCP early next week to have this rechecked.      On 6/18, he went back in the hospital and that visit was as follows:    Hospital Course: The patient was admitted from the wound care center on 6/18/19 and underwent excisional debridement of the right calf wound with abscess drainage and fasciotomy 4 hours later in the operating room under general anesthesia. Initial cultures at 24 hours after surgery are negative and the patient has marked improvement in edema and erythema associated with the right calf. The wound is hemostatic with excellent healthy tissue in the wound bed redressed as described above. The patient will resume oral clindamycin at home at discharge and continue his current course of therapy. Instructions were given to his wife on how to perform once daily Dakin's dressings as performed under her observation at the bedside. The patient is instructed to take anti-inflammatory medications such as ibuprofen up to 2400 milligrams over a 24-hour period of time. I have asked the patient and his wife to go. He experiences increasing pain, fever, or excessive bleeding from the right calf wound. There are no restrictions on activity other than no driving for the next 24 hours and the patient understands that he should have adult supervision for the next 24 hours. After that period of time there are no restrictions on driving or walking. Follow-up visit in the  "wound center in 2 days and 6/21/18.    Repeat C14 in August showed improving LFTs from his prior hospitalization.    Ancillary, he is not tolerating the Vyvanse due to sweating issues and would like to try Provigil (some of his partners take and do well). Pt works all hours of the day for the [employer] and constantly works past 6pm.    The following portions of the patient's history were reviewed and updated as appropriate: allergies, current medications, past family history, past medical history, past social history, past surgical history and problem list.    Review of Systems   Constitutional: Negative for activity change, chills, fatigue and fever.   Respiratory: Negative for cough and shortness of breath.    Cardiovascular: Negative for chest pain and palpitations.   Gastrointestinal: Negative for abdominal pain.   Endocrine: Negative for cold intolerance.   Psychiatric/Behavioral: Negative for behavioral problems and dysphoric mood. The patient is not nervous/anxious.      /71   Pulse 60   Temp 98.2 °F (36.8 °C) (Oral)   Resp 14   Ht 185.4 cm (73\")   Wt 90.3 kg (199 lb)   BMI 26.25 kg/m²     Objective   Physical Exam   Constitutional: He appears well-developed and well-nourished.   Neck: Neck supple. No thyromegaly present.   Cardiovascular: Normal rate and regular rhythm.   No murmur heard.  Pulmonary/Chest: Effort normal and breath sounds normal.   Abdominal: Bowel sounds are normal. There is no tenderness.   Psychiatric: He has a normal mood and affect. His behavior is normal.   Nursing note and vitals reviewed.  Hospital records reviewed with pt confirming HPI.    The patient has read and signed the [facility] Controlled Substance Contract.  I will continue to see patient for regular follow up appointments.  They are well controlled on their medication.  YEHUDA has been reviewed by me and is updated every 3 months. The patient is aware of the potential for addiction and " dependence.    Assessment/Plan   Mario was seen today for right lower leg pain and adhd.    Diagnoses and all orders for this visit:    Shift work sleep disorder  -     modafinil (PROVIGIL) 100 MG tablet; Take 1 tablet by mouth Daily.    Immunization due  -     Hepatitis A Vaccine Adult IM

## 2020-07-28 ENCOUNTER — OFFICE VISIT (OUTPATIENT)
Dept: FAMILY MEDICINE CLINIC | Facility: CLINIC | Age: 40
End: 2020-07-28

## 2020-07-28 VITALS
RESPIRATION RATE: 16 BRPM | TEMPERATURE: 97.3 F | SYSTOLIC BLOOD PRESSURE: 104 MMHG | WEIGHT: 187.4 LBS | BODY MASS INDEX: 26.23 KG/M2 | OXYGEN SATURATION: 95 % | DIASTOLIC BLOOD PRESSURE: 72 MMHG | HEART RATE: 68 BPM | HEIGHT: 71 IN

## 2020-07-28 DIAGNOSIS — M54.50 ACUTE LEFT-SIDED LOW BACK PAIN WITHOUT SCIATICA: ICD-10-CM

## 2020-07-28 DIAGNOSIS — G47.26 SHIFT WORK SLEEP DISORDER: ICD-10-CM

## 2020-07-28 DIAGNOSIS — E78.2 MIXED HYPERLIPIDEMIA: Primary | ICD-10-CM

## 2020-07-28 DIAGNOSIS — G47.00 INSOMNIA, UNSPECIFIED TYPE: ICD-10-CM

## 2020-07-28 LAB
BILIRUB BLD-MCNC: NEGATIVE MG/DL
CLARITY, POC: CLEAR
COLOR UR: YELLOW
GLUCOSE UR STRIP-MCNC: NEGATIVE MG/DL
KETONES UR QL: NEGATIVE
LEUKOCYTE EST, POC: NEGATIVE
NITRITE UR-MCNC: NEGATIVE MG/ML
PH UR: 6.5 [PH] (ref 5–8)
PROT UR STRIP-MCNC: NEGATIVE MG/DL
RBC # UR STRIP: NEGATIVE /UL
SP GR UR: 1.01 (ref 1–1.03)
UROBILINOGEN UR QL: NORMAL

## 2020-07-28 PROCEDURE — 81003 URINALYSIS AUTO W/O SCOPE: CPT | Performed by: NURSE PRACTITIONER

## 2020-07-28 PROCEDURE — 99214 OFFICE O/P EST MOD 30 MIN: CPT | Performed by: NURSE PRACTITIONER

## 2020-07-28 RX ORDER — ZOLPIDEM TARTRATE 10 MG/1
10 TABLET ORAL NIGHTLY PRN
Qty: 30 TABLET | Refills: 0 | Status: SHIPPED | OUTPATIENT
Start: 2020-07-28 | End: 2022-07-05 | Stop reason: SDUPTHER

## 2020-07-28 RX ORDER — MODAFINIL 100 MG/1
100 TABLET ORAL DAILY
Qty: 30 TABLET | Refills: 0 | Status: SHIPPED | OUTPATIENT
Start: 2020-07-28 | End: 2022-07-05 | Stop reason: SDUPTHER

## 2020-07-28 NOTE — PROGRESS NOTES
Subjective   Mario East Jr. is a 40 y.o. male.     History of Present Illness   Answers for HPI/ROS submitted by the patient on 7/27/2020   What is the primary reason for your visit?: Other  Please describe your symptoms.: Kidney pain  Have you had these symptoms before?: No  How long have you been having these symptoms?: 5-7 days    States he increased water intake the past 2 days and symptoms resolved.  Denies urinary symptoms or hematuria.     Patient also requests refill on Ambien and Provigil which he takes as needed for shift work sleep disorder.  He uses these infrequently and has not had filled in a couple of years.  He last needed medications a month ago.  Denies any issues or side effects.      Would like labs to recheck lipid panel.   The following portions of the patient's history were reviewed and updated as appropriate: allergies, current medications, past family history, past medical history, past social history, past surgical history and problem list.     Review of Systems   Constitutional: Negative for fatigue.   Respiratory: Negative for cough and shortness of breath.    Cardiovascular: Negative for chest pain and palpitations.   Genitourinary: Negative for decreased urine volume, difficulty urinating, discharge, dysuria, enuresis, flank pain, frequency, genital sores, hematuria, penile pain, penile swelling, scrotal swelling, testicular pain and urgency.   Musculoskeletal: Negative for back pain.   Skin: Negative for rash.   Psychiatric/Behavioral: Negative for dysphoric mood and sleep disturbance. The patient is not nervous/anxious.        Objective   Physical Exam   Constitutional: He is oriented to person, place, and time. He appears well-developed and well-nourished.   Pulmonary/Chest: Effort normal.   Abdominal: There is no CVA tenderness.   Musculoskeletal:        Lumbar back: He exhibits normal range of motion, no tenderness, no bony tenderness, no swelling, no edema, no deformity,  no laceration, no pain, no spasm and normal pulse.        Back:    Neurological: He is alert and oriented to person, place, and time.   Psychiatric: He has a normal mood and affect. His behavior is normal. Judgment and thought content normal.   Nursing note and vitals reviewed.      Assessment/Plan   Mario was seen today for back pain and wouuld like refills on ambien and provigil.    Diagnoses and all orders for this visit:    Mixed hyperlipidemia  -     Comprehensive metabolic panel  -     CBC and Differential  -     Lipoprotein NMR  -     TSH    Acute left-sided low back pain without sciatica  Comments:  resolved  Orders:  -     POCT urinalysis dipstick, automated    Shift work sleep disorder        YEHUDA reviewed.       Patient filled out CSA forms.  Patient understands that prescriptions may not be approved but will be sent to Dr. Jung for review.

## 2020-07-28 NOTE — TELEPHONE ENCOUNTER
Has not had these filled in a couple of years.  Uses as needed for shift work sleep disorder.  CSA filled out in case and YEHUDA reviewed.

## 2020-08-11 LAB
ALBUMIN SERPL-MCNC: 4.6 G/DL (ref 4–5)
ALBUMIN/GLOB SERPL: 2.4 {RATIO} (ref 1.2–2.2)
ALP SERPL-CCNC: 87 IU/L (ref 39–117)
ALT SERPL-CCNC: 23 IU/L (ref 0–44)
AST SERPL-CCNC: 21 IU/L (ref 0–40)
BASOPHILS # BLD AUTO: 0 X10E3/UL (ref 0–0.2)
BASOPHILS NFR BLD AUTO: 0 %
BILIRUB SERPL-MCNC: 0.5 MG/DL (ref 0–1.2)
BUN SERPL-MCNC: 15 MG/DL (ref 6–24)
BUN/CREAT SERPL: 13 (ref 9–20)
CALCIUM SERPL-MCNC: 9.7 MG/DL (ref 8.7–10.2)
CHLORIDE SERPL-SCNC: 105 MMOL/L (ref 96–106)
CHOLEST SERPL-MCNC: 184 MG/DL (ref 100–199)
CO2 SERPL-SCNC: 21 MMOL/L (ref 20–29)
CREAT SERPL-MCNC: 1.15 MG/DL (ref 0.76–1.27)
EOSINOPHIL # BLD AUTO: 0 X10E3/UL (ref 0–0.4)
EOSINOPHIL NFR BLD AUTO: 1 %
ERYTHROCYTE [DISTWIDTH] IN BLOOD BY AUTOMATED COUNT: 12.1 % (ref 11.6–15.4)
GLOBULIN SER CALC-MCNC: 1.9 G/DL (ref 1.5–4.5)
GLUCOSE SERPL-MCNC: 90 MG/DL (ref 65–99)
HCT VFR BLD AUTO: 40.8 % (ref 37.5–51)
HDL SERPL-SCNC: 33.9 UMOL/L
HDLC SERPL-MCNC: 46 MG/DL
HGB BLD-MCNC: 13.7 G/DL (ref 13–17.7)
IMM GRANULOCYTES # BLD AUTO: 0 X10E3/UL (ref 0–0.1)
IMM GRANULOCYTES NFR BLD AUTO: 0 %
LDL SERPL QN: 20.7 NM
LDL SERPL-SCNC: 1331 NMOL/L
LDL SMALL SERPL-SCNC: 719 NMOL/L
LDLC SERPL CALC-MCNC: 119 MG/DL (ref 0–99)
LP-IR SCORE SERPL: 51
LYMPHOCYTES # BLD AUTO: 0.8 X10E3/UL (ref 0.7–3.1)
LYMPHOCYTES NFR BLD AUTO: 16 %
MCH RBC QN AUTO: 30.8 PG (ref 26.6–33)
MCHC RBC AUTO-ENTMCNC: 33.6 G/DL (ref 31.5–35.7)
MCV RBC AUTO: 92 FL (ref 79–97)
MONOCYTES # BLD AUTO: 0.5 X10E3/UL (ref 0.1–0.9)
MONOCYTES NFR BLD AUTO: 11 %
NEUTROPHILS # BLD AUTO: 3.5 X10E3/UL (ref 1.4–7)
NEUTROPHILS NFR BLD AUTO: 72 %
PLATELET # BLD AUTO: 179 X10E3/UL (ref 150–450)
POTASSIUM SERPL-SCNC: 4.5 MMOL/L (ref 3.5–5.2)
PROT SERPL-MCNC: 6.5 G/DL (ref 6–8.5)
RBC # BLD AUTO: 4.45 X10E6/UL (ref 4.14–5.8)
SODIUM SERPL-SCNC: 144 MMOL/L (ref 134–144)
TRIGL SERPL-MCNC: 96 MG/DL (ref 0–149)
TSH SERPL DL<=0.005 MIU/L-ACNC: 1.04 UIU/ML (ref 0.45–4.5)
WBC # BLD AUTO: 4.9 X10E3/UL (ref 3.4–10.8)

## 2021-03-14 NOTE — PROGRESS NOTES
"Subjective   Mario East Jr. is a 40 y.o. male.     CC: Leg Pain    History of Present Illness     Pt comes in today c/o some pain of his LLE for 3 months w/o known reason. Pt had a prior major injury with multiple surgeries of the RLE > 1 year ago and was in a boot for 7 months. Pt is starting to get more active/run and reports having had some left hip pain issues that resolved with PT and Diclofenac. Now, pt reports medical left calf pain and \"tightness\", worse in the AM and whenever sits for a while. Three months of this and not getting better. No edema of the LLE.      The following portions of the patient's history were reviewed and updated as appropriate: allergies, current medications, past family history, past medical history, past social history, past surgical history and problem list.    Review of Systems   Constitutional: Negative for activity change, chills and fever.   Respiratory: Negative for cough.    Cardiovascular: Negative for chest pain.   Musculoskeletal:        Left leg pain   Psychiatric/Behavioral: Negative for dysphoric mood.       Objective   Physical Exam  Constitutional:       General: He is not in acute distress.     Appearance: He is well-developed.   Pulmonary:      Effort: Pulmonary effort is normal.   Musculoskeletal:         General: Tenderness (medial left upper/mid calf where the muscel contacts the bone) present.   Neurological:      Mental Status: He is alert and oriented to person, place, and time.   Psychiatric:         Behavior: Behavior normal.         Thought Content: Thought content normal.         Assessment/Plan   Diagnoses and all orders for this visit:    1. Chronic pain of left lower extremity (Primary)  -     MRI tibia fibula left wo contrast; Future  -     celecoxib (CeleBREX) 200 MG capsule; Take 1 capsule by mouth Daily.  Dispense: 30 capsule; Refill: 11    Heat/stretching discussed.           "

## 2021-03-15 ENCOUNTER — OFFICE VISIT (OUTPATIENT)
Dept: FAMILY MEDICINE CLINIC | Facility: CLINIC | Age: 41
End: 2021-03-15

## 2021-03-15 VITALS
TEMPERATURE: 97.3 F | HEART RATE: 66 BPM | SYSTOLIC BLOOD PRESSURE: 113 MMHG | WEIGHT: 192 LBS | BODY MASS INDEX: 26.88 KG/M2 | DIASTOLIC BLOOD PRESSURE: 71 MMHG | RESPIRATION RATE: 16 BRPM | HEIGHT: 71 IN

## 2021-03-15 DIAGNOSIS — G89.29 CHRONIC PAIN OF LEFT LOWER EXTREMITY: Primary | ICD-10-CM

## 2021-03-15 DIAGNOSIS — M79.605 CHRONIC PAIN OF LEFT LOWER EXTREMITY: Primary | ICD-10-CM

## 2021-03-15 PROCEDURE — 99213 OFFICE O/P EST LOW 20 MIN: CPT | Performed by: FAMILY MEDICINE

## 2021-03-15 RX ORDER — CELECOXIB 200 MG/1
200 CAPSULE ORAL DAILY
Qty: 30 CAPSULE | Refills: 11 | Status: SHIPPED | OUTPATIENT
Start: 2021-03-15 | End: 2021-08-05

## 2021-04-06 ENCOUNTER — BULK ORDERING (OUTPATIENT)
Dept: CASE MANAGEMENT | Facility: OTHER | Age: 41
End: 2021-04-06

## 2021-04-06 DIAGNOSIS — Z23 IMMUNIZATION DUE: ICD-10-CM

## 2021-04-16 ENCOUNTER — APPOINTMENT (OUTPATIENT)
Dept: MRI IMAGING | Facility: HOSPITAL | Age: 41
End: 2021-04-16

## 2021-07-27 ENCOUNTER — PATIENT MESSAGE (OUTPATIENT)
Dept: FAMILY MEDICINE CLINIC | Facility: CLINIC | Age: 41
End: 2021-07-27

## 2021-07-28 DIAGNOSIS — G47.26 SHIFT WORK SLEEP DISORDER: ICD-10-CM

## 2021-07-28 RX ORDER — MODAFINIL 100 MG/1
TABLET ORAL
Qty: 30 TABLET | Refills: 0 | OUTPATIENT
Start: 2021-07-28

## 2021-07-29 PROBLEM — M48.061 SPINAL STENOSIS OF LUMBAR REGION WITHOUT NEUROGENIC CLAUDICATION: Status: ACTIVE | Noted: 2021-07-29

## 2021-08-04 RX ORDER — ZOLPIDEM TARTRATE 10 MG/1
10 TABLET ORAL NIGHTLY PRN
Qty: 30 TABLET | Refills: 2 | Status: CANCELLED | OUTPATIENT
Start: 2021-08-04

## 2021-08-04 RX ORDER — MODAFINIL 100 MG/1
100 TABLET ORAL DAILY
Qty: 30 TABLET | Refills: 2 | Status: CANCELLED | OUTPATIENT
Start: 2021-08-04

## 2021-08-04 NOTE — PROGRESS NOTES
Subjective   Mario East Jr. is a 41 y.o. male.     History of Present Illness     Chief Complaint: No chief complaint on file.      Mario East Jr. 41 y.o. male who presents today for Medical Management of the below listed issues and medication refills.  he has a problem list of   Patient Active Problem List   Diagnosis   • Varicose vein of leg   • Cellulitis of right anterior lower leg - traumatic   • Back pain   • Cellulitis of right leg without foot   • Nonhealing surgical wound, subsequent encounter   • Traumatic open wound of right lower leg   • Shift work sleep disorder   • Spinal stenosis of lumbar region without neurogenic claudication   .  Since the last visit, he has overall felt well.  he has been compliant with   Current Outpatient Medications:   •  celecoxib (CeleBREX) 200 MG capsule, Take 1 capsule by mouth Daily., Disp: 30 capsule, Rfl: 11  •  ibuprofen (ADVIL) 200 MG tablet, Take 200 mg by mouth Every 6 (Six) Hours As Needed for Mild Pain ., Disp: , Rfl:   •  modafinil (Provigil) 100 MG tablet, Take 1 tablet by mouth Daily., Disp: 30 tablet, Rfl: 0  •  zolpidem (AMBIEN) 10 MG tablet, Take 1 tablet by mouth At Night As Needed for Sleep., Disp: 30 tablet, Rfl: 0.  he denies medication side effects.    All of the other chronic condition(s) listed above are stable w/o issues.    There were no vitals taken for this visit.    Results for orders placed or performed in visit on 07/28/20   Comprehensive metabolic panel    Specimen: Blood   Result Value Ref Range    Glucose 90 65 - 99 mg/dL    BUN 15 6 - 24 mg/dL    Creatinine 1.15 0.76 - 1.27 mg/dL    eGFR Non African Am 79 >59 mL/min/1.73    eGFR African Am 91 >59 mL/min/1.73    BUN/Creatinine Ratio 13 9 - 20    Sodium 144 134 - 144 mmol/L    Potassium 4.5 3.5 - 5.2 mmol/L    Chloride 105 96 - 106 mmol/L    Total CO2 21 20 - 29 mmol/L    Calcium 9.7 8.7 - 10.2 mg/dL    Total Protein 6.5 6.0 - 8.5 g/dL    Albumin 4.6 4.0 - 5.0 g/dL    Globulin  1.9 1.5 - 4.5 g/dL    A/G Ratio 2.4 (H) 1.2 - 2.2    Total Bilirubin 0.5 0.0 - 1.2 mg/dL    Alkaline Phosphatase 87 39 - 117 IU/L    AST (SGOT) 21 0 - 40 IU/L    ALT (SGPT) 23 0 - 44 IU/L   Lipoprotein NMR    Specimen: Blood   Result Value Ref Range    LDL-P 1,331 (H) <1,000 nmol/L    LDL-C 119 (H) 0 - 99 mg/dL    HDL-C 46 >39 mg/dL    Triglycerides 96 0 - 149 mg/dL    Total Cholesterol 184 100 - 199 mg/dL    HDL-P (Total) 33.9 >=30.5 umol/L    Small LDL-P 719 (H) <=527 nmol/L    LDL Size 20.7 >20.5 nm    LP-IR Score** 51 (H) <=45   TSH    Specimen: Blood   Result Value Ref Range    TSH 1.040 0.450 - 4.500 uIU/mL   POCT urinalysis dipstick, automated    Specimen: Urine   Result Value Ref Range    Color Yellow Yellow, Straw, Dark Yellow, Lovely    Clarity, UA Clear Clear    Specific Gravity  1.010 1.005 - 1.030    pH, Urine 6.5 5.0 - 8.0    Leukocytes Negative Negative    Nitrite, UA Negative Negative    Protein, POC Negative Negative mg/dL    Glucose, UA Negative Negative, 1000 mg/dL (3+) mg/dL    Ketones, UA Negative Negative    Urobilinogen, UA Normal Normal    Bilirubin Negative Negative    Blood, UA Negative Negative   CBC and Differential    Specimen: Blood   Result Value Ref Range    WBC 4.9 3.4 - 10.8 x10E3/uL    RBC 4.45 4.14 - 5.80 x10E6/uL    Hemoglobin 13.7 13.0 - 17.7 g/dL    Hematocrit 40.8 37.5 - 51.0 %    MCV 92 79 - 97 fL    MCH 30.8 26.6 - 33.0 pg    MCHC 33.6 31.5 - 35.7 g/dL    RDW 12.1 11.6 - 15.4 %    Platelets 179 150 - 450 x10E3/uL    Neutrophil Rel % 72 Not Estab. %    Lymphocyte Rel % 16 Not Estab. %    Monocyte Rel % 11 Not Estab. %    Eosinophil Rel % 1 Not Estab. %    Basophil Rel % 0 Not Estab. %    Neutrophils Absolute 3.5 1.4 - 7.0 x10E3/uL    Lymphocytes Absolute 0.8 0.7 - 3.1 x10E3/uL    Monocytes Absolute 0.5 0.1 - 0.9 x10E3/uL    Eosinophils Absolute 0.0 0.0 - 0.4 x10E3/uL    Basophils Absolute 0.0 0.0 - 0.2 x10E3/uL    Immature Granulocyte Rel % 0 Not Estab. %    Immature Grans  Absolute 0.0 0.0 - 0.1 x10E3/uL           The following portions of the patient's history were reviewed and updated as appropriate: allergies, current medications, past family history, past medical history, past social history, past surgical history and problem list.    Review of Systems   Constitutional: Negative for activity change, chills and fever.   Respiratory: Negative for cough.    Cardiovascular: Negative for chest pain.   Psychiatric/Behavioral: Negative for dysphoric mood.       Objective   Physical Exam  Constitutional:       General: He is not in acute distress.     Appearance: He is well-developed.   Cardiovascular:      Rate and Rhythm: Normal rate and regular rhythm.   Pulmonary:      Effort: Pulmonary effort is normal.      Breath sounds: Normal breath sounds.   Neurological:      Mental Status: He is alert and oriented to person, place, and time.   Psychiatric:         Behavior: Behavior normal.         Thought Content: Thought content normal.       The patient has read and signed the [facility] Controlled Substance Contract.  I will continue to see patient for regular follow up appointments.  They are well controlled on their medication.  YEHUDA has been reviewed by me and is updated every 3 months. The patient is aware of the potential for addiction and dependence.      Assessment/Plan   Diagnoses and all orders for this visit:    1. Insomnia, unspecified type  -     zolpidem (AMBIEN) 10 MG tablet; Take 1 tablet by mouth At Night As Needed for Sleep.  Dispense: 30 tablet; Refill: 2    2. Shift work sleep disorder  -     modafinil (Provigil) 100 MG tablet; Take 1 tablet by mouth Daily.  Dispense: 30 tablet; Refill: 2

## 2021-08-05 ENCOUNTER — OFFICE VISIT (OUTPATIENT)
Dept: FAMILY MEDICINE CLINIC | Facility: CLINIC | Age: 41
End: 2021-08-05

## 2021-08-05 VITALS
TEMPERATURE: 97.7 F | BODY MASS INDEX: 26.18 KG/M2 | WEIGHT: 187 LBS | HEART RATE: 59 BPM | DIASTOLIC BLOOD PRESSURE: 69 MMHG | RESPIRATION RATE: 16 BRPM | OXYGEN SATURATION: 98 % | HEIGHT: 71 IN | SYSTOLIC BLOOD PRESSURE: 106 MMHG

## 2021-08-05 DIAGNOSIS — I89.0 LYMPHEDEMA: Primary | ICD-10-CM

## 2021-08-05 PROCEDURE — 99212 OFFICE O/P EST SF 10 MIN: CPT | Performed by: FAMILY MEDICINE

## 2021-08-05 RX ORDER — FLUTICASONE PROPIONATE 50 MCG
SPRAY, SUSPENSION (ML) NASAL
COMMUNITY
Start: 2021-06-16

## 2021-08-05 RX ORDER — LORATADINE 10 MG/1
TABLET ORAL
COMMUNITY
Start: 2021-07-28

## 2021-08-05 RX ORDER — OMEPRAZOLE 40 MG/1
CAPSULE, DELAYED RELEASE ORAL
COMMUNITY
Start: 2021-07-11

## 2021-08-05 NOTE — PROGRESS NOTES
"Subjective   Mairo East Jr. is a 41 y.o. male.     CC: Management of Lymphedema    History of Present Illness     Pt comes in today for management of his RLE lymphedema. Pt using compression and also machine pump and is wishing to get more PT on the area.    The following portions of the patient's history were reviewed and updated as appropriate: allergies, current medications, past family history, past medical history, past social history, past surgical history and problem list.    Review of Systems   Constitutional: Negative for activity change, chills and fever.   Respiratory: Negative for cough.    Cardiovascular: Negative for chest pain.   Psychiatric/Behavioral: Negative for dysphoric mood.       /69   Pulse 59   Temp 97.7 °F (36.5 °C)   Resp 16   Ht 180.3 cm (71\")   Wt 84.8 kg (187 lb)   SpO2 98%   BMI 26.08 kg/m²     Objective   Physical Exam  Constitutional:       General: He is not in acute distress.     Appearance: He is well-developed.   Pulmonary:      Effort: Pulmonary effort is normal.   Neurological:      Mental Status: He is alert and oriented to person, place, and time.   Psychiatric:         Behavior: Behavior normal.         Thought Content: Thought content normal.         Assessment/Plan   Diagnoses and all orders for this visit:    1. Lymphedema (Primary)  -     Ambulatory Referral to Physical Therapy Evaluate and treat, Lymphedema    Other orders  -     Cancel: zolpidem (AMBIEN) 10 MG tablet; Take 1 tablet by mouth At Night As Needed for Sleep.  Dispense: 30 tablet; Refill: 2  -     Cancel: modafinil (Provigil) 100 MG tablet; Take 1 tablet by mouth Daily.  Dispense: 30 tablet; Refill: 2               "

## 2021-12-29 ENCOUNTER — IMMUNIZATION (OUTPATIENT)
Dept: VACCINE CLINIC | Facility: HOSPITAL | Age: 41
End: 2021-12-29

## 2021-12-29 PROCEDURE — 0004A HC ADM SARSCOV2 30MCG/0.3ML BOOSTER: CPT | Performed by: INTERNAL MEDICINE

## 2021-12-29 PROCEDURE — 91300 HC SARSCOV02 VAC 30MCG/0.3ML IM: CPT | Performed by: INTERNAL MEDICINE

## 2022-01-19 ENCOUNTER — TELEPHONE (OUTPATIENT)
Dept: FAMILY MEDICINE CLINIC | Facility: CLINIC | Age: 42
End: 2022-01-19

## 2022-01-19 NOTE — LETTER
[facility]  IMMUNIZATION CERTIFICATE    (Required for each child enrolled in day care center, certified family  home, other licensed facility which cares for children,  programs, and public and private primary and secondary schools.)    Name of Child:  Mario East Jr.  YOB: 1980   Name of Parent:  ______________________________  Address:  [address] [place] [id number]     VACCINE/DOSE DATE DATE DATE   Hepatitis B 9/10/2010 10/8/2010 3/11/2011   Alt. Adult Hepatitis B¹      DTap/DTP/DT²      Hib³      Pneumococcal (PCV13)      Polio      MMR 9/17/2010     Varicella      Hepatitis A 4/29/2018 11/12/2018    Meningococcal      Td 9/10/2010     Tdap 3/31/2014 6/11/2018    Rotavirus      HPV      Men B      Pneumococcal (PPSV23)        ¹ Alternative two dose series of approved adult hepatitis B vaccine for adolescents 11 through 15 years of age. ² DTaP, DTP, or DT. ³ Hib not required at 5 years of age or more.    Had Chickenpox or Zoster disease: {[facility] IMMUNIZATION CERT - CHICKEN POX:[id number]}    ?  This child is current for immunizations until  /  /  , (14 days after the next shot is due) after which this certificate is no longer valid, and a new certificate must be obtained.  ?  This child is not up-to-date at this time.  This certificate is valid unti  /  /  ,l  (14 days after the next shot is due) after which this certificate is no longer valid, and a new certificate must be obtained.    Reason child is not up-to-date:  ?  Provisional Status - Child is behind on required immunizations.  ?  Medical Exemption - The following immunizations are not medically indicated:  ___________________                                      _______________________________________________________________________________       If Medical Exemption, can these vaccines be administered at a later date?  No:  _  Yes: _  Date: __/__/__    ? [employer] Objection  I  CERTIFY THAT THE ABOVE NAMED CHILD HAS RECEIVED IMMUNIZATIONS AS STIPULATED ABOVE.     __________________________________________________________     Date: 1/19/2022   (Signature of physician, APRN, PA, pharmacist, LHD , RN or LPN designee)      This Certificate should be presented to the school or facility in which the child intends to enroll and should be retained by the school or facility and filed with the child's health record.

## 2022-01-19 NOTE — TELEPHONE ENCOUNTER
Caller: Veronica East    Relationship: Emergency Contact    Best call back number: [phone] (M)    What form or medical record are you requesting: COPY OF IMMUNIZATION NRECORD    How would you like to receive the form or medical records (pick-up, mail, fax):     If pick-up, provide patient with address and location details    Timeframe paperwork needed: ASAP    Additional notes:

## 2022-01-26 ENCOUNTER — TELEPHONE (OUTPATIENT)
Dept: FAMILY MEDICINE CLINIC | Facility: CLINIC | Age: 42
End: 2022-01-26

## 2022-01-31 ENCOUNTER — OFFICE (AMBULATORY)
Dept: URBAN - METROPOLITAN AREA CLINIC 75 | Facility: CLINIC | Age: 42
End: 2022-01-31

## 2022-01-31 VITALS
SYSTOLIC BLOOD PRESSURE: 132 MMHG | HEART RATE: 63 BPM | HEIGHT: 73 IN | WEIGHT: 199 LBS | DIASTOLIC BLOOD PRESSURE: 80 MMHG | OXYGEN SATURATION: 99 %

## 2022-01-31 DIAGNOSIS — R09.89 OTHER SPECIFIED SYMPTOMS AND SIGNS INVOLVING THE CIRCULATORY: ICD-10-CM

## 2022-01-31 DIAGNOSIS — R13.10 DYSPHAGIA, UNSPECIFIED: ICD-10-CM

## 2022-01-31 DIAGNOSIS — K21.9 GASTRO-ESOPHAGEAL REFLUX DISEASE WITHOUT ESOPHAGITIS: ICD-10-CM

## 2022-01-31 PROCEDURE — 99204 OFFICE O/P NEW MOD 45 MIN: CPT | Performed by: INTERNAL MEDICINE

## 2022-01-31 RX ORDER — PANTOPRAZOLE SODIUM 40 MG/1
40 TABLET, DELAYED RELEASE ORAL
Qty: 30 | Refills: 11 | Status: ACTIVE
Start: 2022-01-31

## 2022-02-08 NOTE — TELEPHONE ENCOUNTER
This was sent to the wrong person.     We have talked with pt and let him know that we do not have MMR.

## 2022-03-14 VITALS
HEART RATE: 62 BPM | RESPIRATION RATE: 17 BRPM | HEART RATE: 68 BPM | DIASTOLIC BLOOD PRESSURE: 52 MMHG | DIASTOLIC BLOOD PRESSURE: 72 MMHG | DIASTOLIC BLOOD PRESSURE: 58 MMHG | HEART RATE: 66 BPM | HEART RATE: 60 BPM | RESPIRATION RATE: 16 BRPM | HEART RATE: 65 BPM | DIASTOLIC BLOOD PRESSURE: 40 MMHG | SYSTOLIC BLOOD PRESSURE: 123 MMHG | TEMPERATURE: 98.6 F | TEMPERATURE: 97.1 F | HEIGHT: 73 IN | OXYGEN SATURATION: 100 % | SYSTOLIC BLOOD PRESSURE: 99 MMHG | SYSTOLIC BLOOD PRESSURE: 955 MMHG | OXYGEN SATURATION: 99 % | DIASTOLIC BLOOD PRESSURE: 48 MMHG | DIASTOLIC BLOOD PRESSURE: 47 MMHG | SYSTOLIC BLOOD PRESSURE: 102 MMHG | RESPIRATION RATE: 10 BRPM | HEART RATE: 70 BPM | OXYGEN SATURATION: 98 % | SYSTOLIC BLOOD PRESSURE: 96 MMHG | RESPIRATION RATE: 18 BRPM | DIASTOLIC BLOOD PRESSURE: 69 MMHG | WEIGHT: 183 LBS | HEART RATE: 58 BPM | SYSTOLIC BLOOD PRESSURE: 95 MMHG | RESPIRATION RATE: 12 BRPM | DIASTOLIC BLOOD PRESSURE: 53 MMHG | HEART RATE: 73 BPM | RESPIRATION RATE: 13 BRPM | SYSTOLIC BLOOD PRESSURE: 94 MMHG | SYSTOLIC BLOOD PRESSURE: 110 MMHG | DIASTOLIC BLOOD PRESSURE: 1 MMHG

## 2022-03-16 ENCOUNTER — OFFICE (AMBULATORY)
Dept: URBAN - METROPOLITAN AREA PATHOLOGY 4 | Facility: PATHOLOGY | Age: 42
End: 2022-03-16
Payer: COMMERCIAL

## 2022-03-16 ENCOUNTER — AMBULATORY SURGICAL CENTER (AMBULATORY)
Dept: URBAN - METROPOLITAN AREA SURGERY 17 | Facility: SURGERY | Age: 42
End: 2022-03-16

## 2022-03-16 DIAGNOSIS — K21.9 GASTRO-ESOPHAGEAL REFLUX DISEASE WITHOUT ESOPHAGITIS: ICD-10-CM

## 2022-03-16 DIAGNOSIS — K31.89 OTHER DISEASES OF STOMACH AND DUODENUM: ICD-10-CM

## 2022-03-16 DIAGNOSIS — F45.8 OTHER SOMATOFORM DISORDERS: ICD-10-CM

## 2022-03-16 LAB
GI HISTOLOGY: A. UNSPECIFIED: (no result)
GI HISTOLOGY: B. SELECT: (no result)
GI HISTOLOGY: C. UNSPECIFIED: (no result)
GI HISTOLOGY: D. UNSPECIFIED: (no result)
GI HISTOLOGY: PDF REPORT: (no result)

## 2022-03-16 PROCEDURE — 88305 TISSUE EXAM BY PATHOLOGIST: CPT | Performed by: INTERNAL MEDICINE

## 2022-03-16 PROCEDURE — 43450 DILATE ESOPHAGUS 1/MULT PASS: CPT | Performed by: INTERNAL MEDICINE

## 2022-03-16 PROCEDURE — 43239 EGD BIOPSY SINGLE/MULTIPLE: CPT | Performed by: INTERNAL MEDICINE

## 2022-03-16 PROCEDURE — 88342 IMHCHEM/IMCYTCHM 1ST ANTB: CPT | Performed by: INTERNAL MEDICINE

## 2022-06-01 ENCOUNTER — TELEPHONE (OUTPATIENT)
Dept: FAMILY MEDICINE CLINIC | Facility: CLINIC | Age: 42
End: 2022-06-01

## 2022-06-01 NOTE — TELEPHONE ENCOUNTER
THE PATIENT WOULD LIKE TO SCHEDULE A PHYSICAL SOONER THAN WHAT THE HUB COULD FIND AVAILABLE. PLEASE ADVISE BY CALLING [phone].

## 2022-07-04 NOTE — PROGRESS NOTES
"Subjective   Mario East Jr. is a 42 y.o. male.     CC: Annual Exam    History of Present Illness     Mario East Jr. 42 y.o. male who presents for an Annual Wellness Visit.  he has a history of   Patient Active Problem List   Diagnosis   • Varicose vein of leg   • Cellulitis of right anterior lower leg - traumatic   • Back pain   • Cellulitis of right leg without foot   • Nonhealing surgical wound, subsequent encounter   • Traumatic open wound of right lower leg   • Shift work sleep disorder   • Spinal stenosis of lumbar region without neurogenic claudication   • Appendicitis   .  he has been feeling well.   I  reviewed health maintenance with him as part of my preventative care plan.    The following portions of the patient's history were reviewed and updated as appropriate: allergies, current medications, past family history, past medical history, past social history, past surgical history and problem list.    Review of Systems   Constitutional: Negative for appetite change, fever and unexpected weight change.   HENT: Negative for ear pain, facial swelling and sore throat.    Eyes: Negative for pain and visual disturbance.   Respiratory: Negative for chest tightness, shortness of breath and wheezing.    Cardiovascular: Negative for chest pain and palpitations.   Gastrointestinal: Negative for abdominal pain and blood in stool.   Endocrine: Negative.    Genitourinary: Negative for difficulty urinating and hematuria.   Musculoskeletal: Negative for joint swelling.   Neurological: Negative for tremors, seizures and syncope.   Hematological: Negative for adenopathy.   Psychiatric/Behavioral: Negative for dysphoric mood. The patient is nervous/anxious (chronic, situational).        /66   Pulse 58   Temp 98.1 °F (36.7 °C)   Resp 18   Ht 180.3 cm (71\")   Wt 86.6 kg (191 lb)   SpO2 99%   BMI 26.64 kg/m²     Objective   Physical Exam  Vitals and nursing note reviewed.   Constitutional:       " General: He is not in acute distress.     Appearance: He is well-developed. He is not diaphoretic.   HENT:      Head: Normocephalic and atraumatic. Hair is normal.      Right Ear: Hearing, tympanic membrane, ear canal and external ear normal.      Left Ear: Hearing, tympanic membrane, ear canal and external ear normal.      Nose: No nasal deformity.      Mouth/Throat:      Mouth: No oral lesions.      Pharynx: Uvula midline. No uvula swelling.   Eyes:      General: Lids are normal. No scleral icterus.        Right eye: No discharge.         Left eye: No discharge.      Extraocular Movements:      Right eye: Normal extraocular motion and no nystagmus.      Left eye: Normal extraocular motion and no nystagmus.      Conjunctiva/sclera: Conjunctivae normal.      Pupils: Pupils are equal, round, and reactive to light.   Neck:      Thyroid: No thyromegaly.      Vascular: No JVD.      Trachea: No tracheal deviation.   Cardiovascular:      Rate and Rhythm: Normal rate and regular rhythm.      Pulses: Normal pulses.      Heart sounds: Normal heart sounds. No murmur heard.    No gallop.   Pulmonary:      Effort: Pulmonary effort is normal. No respiratory distress.      Breath sounds: Normal breath sounds. No wheezing or rales.   Chest:      Chest wall: No tenderness.   Abdominal:      General: Bowel sounds are normal. There is no distension.      Palpations: Abdomen is soft. There is no mass.      Tenderness: There is no abdominal tenderness. There is no guarding.      Hernia: No hernia is present.   Genitourinary:     Prostate: Normal.      Rectum: Normal.   Musculoskeletal:         General: No tenderness or deformity. Normal range of motion.      Cervical back: Normal range of motion and neck supple.   Lymphadenopathy:      Cervical: No cervical adenopathy.   Skin:     General: Skin is warm and dry.      Findings: No rash.   Neurological:      Mental Status: He is alert and oriented to person, place, and time.      Cranial  Nerves: No cranial nerve deficit.      Motor: No abnormal muscle tone.      Coordination: Coordination normal.      Deep Tendon Reflexes: Reflexes are normal and symmetric. Reflexes normal.   Psychiatric:         Behavior: Behavior normal.         Thought Content: Thought content normal.         Judgment: Judgment normal.         Assessment & Plan   Diagnoses and all orders for this visit:    1. Annual physical exam (Primary)  -     Comprehensive metabolic panel  -     Lipid panel  -     CBC and Differential  -     TSH  -     PSA    2. Situational anxiety  -     desvenlafaxine (Pristiq) 50 MG 24 hr tablet; Take 1 tablet by mouth Daily.  Dispense: 90 tablet; Refill: 1    3. Insomnia, unspecified type  -     zolpidem (AMBIEN) 10 MG tablet; Take 1 tablet by mouth At Night As Needed for Sleep.  Dispense: 30 tablet; Refill: 2    4. Shift work sleep disorder  -     modafinil (Provigil) 100 MG tablet; Take 1 tablet by mouth Daily.  Dispense: 30 tablet; Refill: 2    Healthy diet and exercise discussed.

## 2022-07-05 ENCOUNTER — OFFICE VISIT (OUTPATIENT)
Dept: FAMILY MEDICINE CLINIC | Facility: CLINIC | Age: 42
End: 2022-07-05

## 2022-07-05 VITALS
SYSTOLIC BLOOD PRESSURE: 118 MMHG | BODY MASS INDEX: 26.74 KG/M2 | WEIGHT: 191 LBS | DIASTOLIC BLOOD PRESSURE: 66 MMHG | RESPIRATION RATE: 18 BRPM | TEMPERATURE: 98.1 F | OXYGEN SATURATION: 99 % | HEIGHT: 71 IN | HEART RATE: 58 BPM

## 2022-07-05 DIAGNOSIS — G47.26 SHIFT WORK SLEEP DISORDER: ICD-10-CM

## 2022-07-05 DIAGNOSIS — F41.8 SITUATIONAL ANXIETY: ICD-10-CM

## 2022-07-05 DIAGNOSIS — G47.00 INSOMNIA, UNSPECIFIED TYPE: ICD-10-CM

## 2022-07-05 DIAGNOSIS — Z00.00 ANNUAL PHYSICAL EXAM: Primary | ICD-10-CM

## 2022-07-05 PROBLEM — K37 APPENDICITIS: Status: ACTIVE | Noted: 2019-09-20

## 2022-07-05 PROCEDURE — 99396 PREV VISIT EST AGE 40-64: CPT | Performed by: FAMILY MEDICINE

## 2022-07-05 RX ORDER — PANTOPRAZOLE SODIUM 40 MG/1
40 TABLET, DELAYED RELEASE ORAL
COMMUNITY
Start: 2022-02-16

## 2022-07-05 RX ORDER — ZOLPIDEM TARTRATE 10 MG/1
10 TABLET ORAL NIGHTLY PRN
Qty: 30 TABLET | Refills: 2 | Status: SHIPPED | OUTPATIENT
Start: 2022-07-05 | End: 2023-03-14 | Stop reason: SDUPTHER

## 2022-07-05 RX ORDER — MODAFINIL 100 MG/1
100 TABLET ORAL DAILY
Qty: 30 TABLET | Refills: 2 | Status: SHIPPED | OUTPATIENT
Start: 2022-07-05 | End: 2023-03-14 | Stop reason: SDUPTHER

## 2022-07-05 RX ORDER — DESVENLAFAXINE SUCCINATE 50 MG/1
50 TABLET, EXTENDED RELEASE ORAL DAILY
Qty: 90 TABLET | Refills: 1 | Status: SHIPPED | OUTPATIENT
Start: 2022-07-05 | End: 2022-12-11

## 2022-07-06 LAB
ALBUMIN SERPL-MCNC: 4.7 G/DL (ref 4–5)
ALBUMIN/GLOB SERPL: 2.4 {RATIO} (ref 1.2–2.2)
ALP SERPL-CCNC: 83 IU/L (ref 44–121)
ALT SERPL-CCNC: 25 IU/L (ref 0–44)
AST SERPL-CCNC: 20 IU/L (ref 0–40)
BASOPHILS # BLD AUTO: 0 X10E3/UL (ref 0–0.2)
BASOPHILS NFR BLD AUTO: 1 %
BILIRUB SERPL-MCNC: 0.6 MG/DL (ref 0–1.2)
BUN SERPL-MCNC: 15 MG/DL (ref 6–24)
BUN/CREAT SERPL: 15 (ref 9–20)
CALCIUM SERPL-MCNC: 9.7 MG/DL (ref 8.7–10.2)
CHLORIDE SERPL-SCNC: 102 MMOL/L (ref 96–106)
CHOLEST SERPL-MCNC: 209 MG/DL (ref 100–199)
CO2 SERPL-SCNC: 24 MMOL/L (ref 20–29)
CREAT SERPL-MCNC: 1.02 MG/DL (ref 0.76–1.27)
EGFRCR SERPLBLD CKD-EPI 2021: 94 ML/MIN/1.73
EOSINOPHIL # BLD AUTO: 0.1 X10E3/UL (ref 0–0.4)
EOSINOPHIL NFR BLD AUTO: 1 %
ERYTHROCYTE [DISTWIDTH] IN BLOOD BY AUTOMATED COUNT: 12 % (ref 11.6–15.4)
GLOBULIN SER CALC-MCNC: 2 G/DL (ref 1.5–4.5)
GLUCOSE SERPL-MCNC: 80 MG/DL (ref 65–99)
HCT VFR BLD AUTO: 40.4 % (ref 37.5–51)
HDLC SERPL-MCNC: 50 MG/DL
HGB BLD-MCNC: 13.8 G/DL (ref 13–17.7)
IMM GRANULOCYTES # BLD AUTO: 0 X10E3/UL (ref 0–0.1)
IMM GRANULOCYTES NFR BLD AUTO: 0 %
LDLC SERPL CALC-MCNC: 142 MG/DL (ref 0–99)
LYMPHOCYTES # BLD AUTO: 1.4 X10E3/UL (ref 0.7–3.1)
LYMPHOCYTES NFR BLD AUTO: 23 %
MCH RBC QN AUTO: 30.7 PG (ref 26.6–33)
MCHC RBC AUTO-ENTMCNC: 34.2 G/DL (ref 31.5–35.7)
MCV RBC AUTO: 90 FL (ref 79–97)
MONOCYTES # BLD AUTO: 0.5 X10E3/UL (ref 0.1–0.9)
MONOCYTES NFR BLD AUTO: 9 %
NEUTROPHILS # BLD AUTO: 4 X10E3/UL (ref 1.4–7)
NEUTROPHILS NFR BLD AUTO: 66 %
PLATELET # BLD AUTO: 228 X10E3/UL (ref 150–450)
POTASSIUM SERPL-SCNC: 4.1 MMOL/L (ref 3.5–5.2)
PROT SERPL-MCNC: 6.7 G/DL (ref 6–8.5)
PSA SERPL-MCNC: 0.3 NG/ML (ref 0–4)
RBC # BLD AUTO: 4.49 X10E6/UL (ref 4.14–5.8)
SODIUM SERPL-SCNC: 140 MMOL/L (ref 134–144)
TRIGL SERPL-MCNC: 94 MG/DL (ref 0–149)
TSH SERPL DL<=0.005 MIU/L-ACNC: 1.27 UIU/ML (ref 0.45–4.5)
VLDLC SERPL CALC-MCNC: 17 MG/DL (ref 5–40)
WBC # BLD AUTO: 6 X10E3/UL (ref 3.4–10.8)

## 2022-07-07 ENCOUNTER — PRIOR AUTHORIZATION (OUTPATIENT)
Dept: FAMILY MEDICINE CLINIC | Facility: CLINIC | Age: 42
End: 2022-07-07

## 2022-12-08 RX ORDER — DESVENLAFAXINE SUCCINATE 50 MG/1
50 TABLET, EXTENDED RELEASE ORAL DAILY
Qty: 90 TABLET | Refills: 1 | Status: CANCELLED | OUTPATIENT
Start: 2022-12-08

## 2022-12-11 PROBLEM — E78.00 PURE HYPERCHOLESTEROLEMIA: Status: ACTIVE | Noted: 2022-12-11

## 2022-12-11 PROBLEM — G47.00 INSOMNIA: Chronic | Status: ACTIVE | Noted: 2022-12-11

## 2022-12-11 RX ORDER — ZOLPIDEM TARTRATE 10 MG/1
10 TABLET ORAL NIGHTLY PRN
Qty: 30 TABLET | Refills: 2 | Status: CANCELLED | OUTPATIENT
Start: 2022-12-11

## 2022-12-11 RX ORDER — MODAFINIL 100 MG/1
100 TABLET ORAL DAILY
Qty: 30 TABLET | Refills: 2 | Status: CANCELLED | OUTPATIENT
Start: 2022-12-11

## 2022-12-11 NOTE — PROGRESS NOTES
"Subjective   Mario East Jr. is a 42 y.o. male.     History of Present Illness     Chief Complaint:   Chief Complaint   Patient presents with   • Hyperlipidemia     TO DISCUSS MEDICATION / STATIN        Mario East Jr. 42 y.o. male who presents today for Medical Management of the below listed issues. He  has a problem list of   Patient Active Problem List   Diagnosis   • Varicose vein of leg   • Cellulitis of right anterior lower leg - traumatic   • Back pain   • Cellulitis of right leg without foot   • Nonhealing surgical wound, subsequent encounter   • Traumatic open wound of right lower leg   • Shift work sleep disorder   • Spinal stenosis of lumbar region without neurogenic claudication   • Appendicitis   • Insomnia   • Pure hypercholesterolemia   • Family history of coronary arteriosclerosis   .  Since the last visit, He has overall felt well. Fh: DAD WITH 2ND RECENT mi AND 4X cabg recently.  he has been compliant with   Current Outpatient Medications:   •  modafinil (Provigil) 100 MG tablet, Take 1 tablet by mouth Daily., Disp: 30 tablet, Rfl: 2  •  zolpidem (AMBIEN) 10 MG tablet, Take 1 tablet by mouth At Night As Needed for Sleep., Disp: 30 tablet, Rfl: 2  •  fluticasone (FLONASE) 50 MCG/ACT nasal spray, , Disp: , Rfl:   •  loratadine (CLARITIN) 10 MG tablet, , Disp: , Rfl:   •  omeprazole (priLOSEC) 40 MG capsule, , Disp: , Rfl:   •  pantoprazole (PROTONIX) 40 MG EC tablet, Take 40 mg by mouth., Disp: , Rfl:   •  rosuvastatin (Crestor) 10 MG tablet, Take 1 tablet by mouth Daily., Disp: 90 tablet, Rfl: 3.  He denies medication side effects.    All of the other chronic condition(s) listed above are stable w/o issues.    /70   Pulse 60   Temp 97.6 °F (36.4 °C) (Oral)   Resp 14   Ht 180.3 cm (71\")   Wt 88 kg (194 lb)   BMI 27.06 kg/m²     Results for orders placed or performed in visit on 07/05/22   Comprehensive metabolic panel    Specimen: Blood   Result Value Ref Range    Glucose 80 " 65 - 99 mg/dL    BUN 15 6 - 24 mg/dL    Creatinine 1.02 0.76 - 1.27 mg/dL    EGFR Result 94 >59 mL/min/1.73    BUN/Creatinine Ratio 15 9 - 20    Sodium 140 134 - 144 mmol/L    Potassium 4.1 3.5 - 5.2 mmol/L    Chloride 102 96 - 106 mmol/L    Total CO2 24 20 - 29 mmol/L    Calcium 9.7 8.7 - 10.2 mg/dL    Total Protein 6.7 6.0 - 8.5 g/dL    Albumin 4.7 4.0 - 5.0 g/dL    Globulin 2.0 1.5 - 4.5 g/dL    A/G Ratio 2.4 (H) 1.2 - 2.2    Total Bilirubin 0.6 0.0 - 1.2 mg/dL    Alkaline Phosphatase 83 44 - 121 IU/L    AST (SGOT) 20 0 - 40 IU/L    ALT (SGPT) 25 0 - 44 IU/L   Lipid panel    Specimen: Blood   Result Value Ref Range    Total Cholesterol 209 (H) 100 - 199 mg/dL    Triglycerides 94 0 - 149 mg/dL    HDL Cholesterol 50 >39 mg/dL    VLDL Cholesterol Niko 17 5 - 40 mg/dL    LDL Chol Calc (NIH) 142 (H) 0 - 99 mg/dL   TSH    Specimen: Blood   Result Value Ref Range    TSH 1.270 0.450 - 4.500 uIU/mL   PSA    Specimen: Blood   Result Value Ref Range    PSA 0.3 0.0 - 4.0 ng/mL   CBC and Differential    Specimen: Blood   Result Value Ref Range    WBC 6.0 3.4 - 10.8 x10E3/uL    RBC 4.49 4.14 - 5.80 x10E6/uL    Hemoglobin 13.8 13.0 - 17.7 g/dL    Hematocrit 40.4 37.5 - 51.0 %    MCV 90 79 - 97 fL    MCH 30.7 26.6 - 33.0 pg    MCHC 34.2 31.5 - 35.7 g/dL    RDW 12.0 11.6 - 15.4 %    Platelets 228 150 - 450 x10E3/uL    Neutrophil Rel % 66 Not Estab. %    Lymphocyte Rel % 23 Not Estab. %    Monocyte Rel % 9 Not Estab. %    Eosinophil Rel % 1 Not Estab. %    Basophil Rel % 1 Not Estab. %    Neutrophils Absolute 4.0 1.4 - 7.0 x10E3/uL    Lymphocytes Absolute 1.4 0.7 - 3.1 x10E3/uL    Monocytes Absolute 0.5 0.1 - 0.9 x10E3/uL    Eosinophils Absolute 0.1 0.0 - 0.4 x10E3/uL    Basophils Absolute 0.0 0.0 - 0.2 x10E3/uL    Immature Granulocyte Rel % 0 Not Estab. %    Immature Grans Absolute 0.0 0.0 - 0.1 x10E3/uL             The following portions of the patient's history were reviewed and updated as appropriate: allergies, current  medications, past family history, past medical history, past social history, past surgical history, and problem list.    Review of Systems   Constitutional: Negative for activity change, chills and fever.   Respiratory: Negative for cough.    Cardiovascular: Negative for chest pain.   Psychiatric/Behavioral: Negative for dysphoric mood.       Objective   Physical Exam  Constitutional:       General: He is not in acute distress.     Appearance: He is well-developed.   Cardiovascular:      Rate and Rhythm: Normal rate and regular rhythm.   Pulmonary:      Effort: Pulmonary effort is normal.      Breath sounds: Normal breath sounds.   Neurological:      Mental Status: He is alert and oriented to person, place, and time.   Psychiatric:         Behavior: Behavior normal.         Thought Content: Thought content normal.     Labs reviewed with pt today during visit. All questions answered.          Diagnoses and all orders for this visit:    1. Pure hypercholesterolemia (Primary)  -     rosuvastatin (Crestor) 10 MG tablet; Take 1 tablet by mouth Daily.  Dispense: 90 tablet; Refill: 3  -     Lipoprotein NMR    2. Family history of coronary arteriosclerosis  -     Lipoprotein NMR

## 2022-12-12 ENCOUNTER — OFFICE VISIT (OUTPATIENT)
Dept: FAMILY MEDICINE CLINIC | Facility: CLINIC | Age: 42
End: 2022-12-12

## 2022-12-12 VITALS
TEMPERATURE: 97.6 F | HEIGHT: 71 IN | WEIGHT: 194 LBS | BODY MASS INDEX: 27.16 KG/M2 | RESPIRATION RATE: 14 BRPM | SYSTOLIC BLOOD PRESSURE: 107 MMHG | DIASTOLIC BLOOD PRESSURE: 70 MMHG | HEART RATE: 60 BPM

## 2022-12-12 DIAGNOSIS — Z82.49 FAMILY HISTORY OF CORONARY ARTERIOSCLEROSIS: ICD-10-CM

## 2022-12-12 DIAGNOSIS — E78.00 PURE HYPERCHOLESTEROLEMIA: Primary | ICD-10-CM

## 2022-12-12 PROCEDURE — 99213 OFFICE O/P EST LOW 20 MIN: CPT | Performed by: FAMILY MEDICINE

## 2022-12-12 RX ORDER — ROSUVASTATIN CALCIUM 10 MG/1
10 TABLET, COATED ORAL DAILY
Qty: 90 TABLET | Refills: 3 | Status: SHIPPED | OUTPATIENT
Start: 2022-12-12

## 2023-03-14 NOTE — PROGRESS NOTES
"Subjective   Mario East Jr. is a 42 y.o. male.     History of Present Illness     Chief Complaint:   Chief Complaint   Patient presents with   • insomnia     Med refill due  / lab results   • Hyperlipidemia     To discuss medication/ side effects  - generic crestor    • Other     Shift-Worker's Syndrome       Mario East Jr. 42 y.o. male who presents today for Medical Management of the below listed issues. He  has a problem list of   Patient Active Problem List   Diagnosis   • Varicose vein of leg   • Cellulitis of right anterior lower leg - traumatic   • Back pain   • Cellulitis of right leg without foot   • Nonhealing surgical wound, subsequent encounter   • Traumatic open wound of right lower leg   • Shift work sleep disorder   • Spinal stenosis of lumbar region without neurogenic claudication   • Appendicitis   • Insomnia   • Pure hypercholesterolemia   • Family history of coronary arteriosclerosis   .  Since the last visit, He has overall felt well although has been having some HAs and other SEs since starting the Crestor.  he has been compliant with   Current Outpatient Medications:   •  modafinil (Provigil) 100 MG tablet, Take 1 tablet by mouth Daily., Disp: 30 tablet, Rfl: 2  •  zolpidem (AMBIEN) 10 MG tablet, Take 1 tablet by mouth At Night As Needed for Sleep., Disp: 30 tablet, Rfl: 2  •  fluticasone (FLONASE) 50 MCG/ACT nasal spray, , Disp: , Rfl:   •  loratadine (CLARITIN) 10 MG tablet, , Disp: , Rfl:   •  omeprazole (priLOSEC) 40 MG capsule, , Disp: , Rfl:   •  pantoprazole (PROTONIX) 40 MG EC tablet, Take 40 mg by mouth., Disp: , Rfl:   •  rosuvastatin (Crestor) 10 MG tablet, Take 1 tablet by mouth Daily., Disp: 90 tablet, Rfl: 3.  He is otherwise doing well.  All of the other chronic condition(s) listed above are stable w/o issues.    /63   Pulse 62   Temp 97.5 °F (36.4 °C) (Oral)   Resp 14   Ht 180.3 cm (71\")   Wt 88.5 kg (195 lb)   SpO2 99%   BMI 27.20 kg/m²     Results for " orders placed or performed in visit on 07/05/22   Comprehensive metabolic panel    Specimen: Blood   Result Value Ref Range    Glucose 80 65 - 99 mg/dL    BUN 15 6 - 24 mg/dL    Creatinine 1.02 0.76 - 1.27 mg/dL    EGFR Result 94 >59 mL/min/1.73    BUN/Creatinine Ratio 15 9 - 20    Sodium 140 134 - 144 mmol/L    Potassium 4.1 3.5 - 5.2 mmol/L    Chloride 102 96 - 106 mmol/L    Total CO2 24 20 - 29 mmol/L    Calcium 9.7 8.7 - 10.2 mg/dL    Total Protein 6.7 6.0 - 8.5 g/dL    Albumin 4.7 4.0 - 5.0 g/dL    Globulin 2.0 1.5 - 4.5 g/dL    A/G Ratio 2.4 (H) 1.2 - 2.2    Total Bilirubin 0.6 0.0 - 1.2 mg/dL    Alkaline Phosphatase 83 44 - 121 IU/L    AST (SGOT) 20 0 - 40 IU/L    ALT (SGPT) 25 0 - 44 IU/L   Lipid panel    Specimen: Blood   Result Value Ref Range    Total Cholesterol 209 (H) 100 - 199 mg/dL    Triglycerides 94 0 - 149 mg/dL    HDL Cholesterol 50 >39 mg/dL    VLDL Cholesterol Niko 17 5 - 40 mg/dL    LDL Chol Calc (NIH) 142 (H) 0 - 99 mg/dL   TSH    Specimen: Blood   Result Value Ref Range    TSH 1.270 0.450 - 4.500 uIU/mL   PSA    Specimen: Blood   Result Value Ref Range    PSA 0.3 0.0 - 4.0 ng/mL   CBC and Differential    Specimen: Blood   Result Value Ref Range    WBC 6.0 3.4 - 10.8 x10E3/uL    RBC 4.49 4.14 - 5.80 x10E6/uL    Hemoglobin 13.8 13.0 - 17.7 g/dL    Hematocrit 40.4 37.5 - 51.0 %    MCV 90 79 - 97 fL    MCH 30.7 26.6 - 33.0 pg    MCHC 34.2 31.5 - 35.7 g/dL    RDW 12.0 11.6 - 15.4 %    Platelets 228 150 - 450 x10E3/uL    Neutrophil Rel % 66 Not Estab. %    Lymphocyte Rel % 23 Not Estab. %    Monocyte Rel % 9 Not Estab. %    Eosinophil Rel % 1 Not Estab. %    Basophil Rel % 1 Not Estab. %    Neutrophils Absolute 4.0 1.4 - 7.0 x10E3/uL    Lymphocytes Absolute 1.4 0.7 - 3.1 x10E3/uL    Monocytes Absolute 0.5 0.1 - 0.9 x10E3/uL    Eosinophils Absolute 0.1 0.0 - 0.4 x10E3/uL    Basophils Absolute 0.0 0.0 - 0.2 x10E3/uL    Immature Granulocyte Rel % 0 Not Estab. %    Immature Grans Absolute 0.0 0.0 - 0.1  x10E3/uL             The following portions of the patient's history were reviewed and updated as appropriate: allergies, current medications, past family history, past medical history, past social history, past surgical history, and problem list.    Review of Systems   Constitutional: Negative for activity change, chills and fever.   Respiratory: Negative for cough.    Cardiovascular: Negative for chest pain.   Psychiatric/Behavioral: Negative for dysphoric mood.       Objective   Physical Exam  Constitutional:       General: He is not in acute distress.     Appearance: He is well-developed.   Cardiovascular:      Rate and Rhythm: Normal rate and regular rhythm.   Pulmonary:      Effort: Pulmonary effort is normal.      Breath sounds: Normal breath sounds.   Neurological:      Mental Status: He is alert and oriented to person, place, and time.   Psychiatric:         Behavior: Behavior normal.         Thought Content: Thought content normal.     Labs done yesterday and pending.      Diagnoses and all orders for this visit:    1. Insomnia, unspecified type (Primary)  -     zolpidem (AMBIEN) 10 MG tablet; Take 1 tablet by mouth At Night As Needed for Sleep.  Dispense: 30 tablet; Refill: 2    2. Shift work sleep disorder  -     modafinil (Provigil) 100 MG tablet; Take 1 tablet by mouth Daily.  Dispense: 30 tablet; Refill: 2    3. Pure hypercholesterolemia    Pt to HOLD the Crestor and wash it out over the next month and let me know if his sx resolve, at which point will change medication. If not, will restart med and continue w/u.

## 2023-03-15 ENCOUNTER — OFFICE VISIT (OUTPATIENT)
Dept: FAMILY MEDICINE CLINIC | Facility: CLINIC | Age: 43
End: 2023-03-15
Payer: COMMERCIAL

## 2023-03-15 VITALS
OXYGEN SATURATION: 99 % | WEIGHT: 195 LBS | HEIGHT: 71 IN | TEMPERATURE: 97.5 F | RESPIRATION RATE: 14 BRPM | DIASTOLIC BLOOD PRESSURE: 63 MMHG | SYSTOLIC BLOOD PRESSURE: 106 MMHG | BODY MASS INDEX: 27.3 KG/M2 | HEART RATE: 62 BPM

## 2023-03-15 DIAGNOSIS — G47.26 SHIFT WORK SLEEP DISORDER: Chronic | ICD-10-CM

## 2023-03-15 DIAGNOSIS — G47.00 INSOMNIA, UNSPECIFIED TYPE: Primary | Chronic | ICD-10-CM

## 2023-03-15 DIAGNOSIS — E78.00 PURE HYPERCHOLESTEROLEMIA: ICD-10-CM

## 2023-03-15 LAB
CHOLEST SERPL-MCNC: 139 MG/DL (ref 100–199)
HDL SERPL-SCNC: 38.7 UMOL/L
HDLC SERPL-MCNC: 53 MG/DL
LDL SERPL QN: 20.4 NM
LDL SERPL-SCNC: 718 NMOL/L
LDL SMALL SERPL-SCNC: 298 NMOL/L
LDLC SERPL CALC-MCNC: 71 MG/DL (ref 0–99)
LP-IR SCORE SERPL: 36
TRIGL SERPL-MCNC: 76 MG/DL (ref 0–149)

## 2023-03-15 PROCEDURE — 99214 OFFICE O/P EST MOD 30 MIN: CPT | Performed by: FAMILY MEDICINE

## 2023-03-15 RX ORDER — MODAFINIL 100 MG/1
100 TABLET ORAL DAILY
Qty: 30 TABLET | Refills: 2 | Status: SHIPPED | OUTPATIENT
Start: 2023-03-15

## 2023-03-15 RX ORDER — ZOLPIDEM TARTRATE 10 MG/1
10 TABLET ORAL NIGHTLY PRN
Qty: 30 TABLET | Refills: 2 | Status: SHIPPED | OUTPATIENT
Start: 2023-03-15

## 2023-04-14 RX ORDER — ATORVASTATIN CALCIUM 10 MG/1
10 TABLET, FILM COATED ORAL DAILY
Qty: 90 TABLET | Refills: 3 | Status: SHIPPED | OUTPATIENT
Start: 2023-04-14

## 2023-04-25 ENCOUNTER — OFFICE VISIT (OUTPATIENT)
Dept: FAMILY MEDICINE CLINIC | Facility: CLINIC | Age: 43
End: 2023-04-25
Payer: COMMERCIAL

## 2023-04-25 VITALS
TEMPERATURE: 98.4 F | WEIGHT: 193 LBS | BODY MASS INDEX: 27.02 KG/M2 | HEART RATE: 64 BPM | OXYGEN SATURATION: 96 % | RESPIRATION RATE: 16 BRPM | SYSTOLIC BLOOD PRESSURE: 114 MMHG | HEIGHT: 71 IN | DIASTOLIC BLOOD PRESSURE: 69 MMHG

## 2023-04-25 DIAGNOSIS — R06.00 DYSPNEA, UNSPECIFIED TYPE: ICD-10-CM

## 2023-04-25 DIAGNOSIS — R00.2 HEART PALPITATIONS: Primary | ICD-10-CM

## 2023-04-25 PROCEDURE — 93000 ELECTROCARDIOGRAM COMPLETE: CPT | Performed by: FAMILY MEDICINE

## 2023-04-25 PROCEDURE — 99214 OFFICE O/P EST MOD 30 MIN: CPT | Performed by: FAMILY MEDICINE

## 2023-04-25 NOTE — PROGRESS NOTES
Procedure     ECG 12 Lead    Date/Time: 4/25/2023 3:20 PM  Performed by: Allen Jung MD  Authorized by: Allen Jung MD   Comparison: compared with previous ECG from 5/24/2018  Similar to previous ECG  Rhythm: sinus rhythm  Rate: normal  Conduction: conduction normal  ST Segments: ST segments normal  T Waves: T waves normal  QRS axis: normal    Clinical impression: normal ECG

## 2023-04-25 NOTE — PROGRESS NOTES
"Subjective   Mario East Jr. is a 43 y.o. male.     CC: Palpitations/Dyspnea    History of Present Illness     Patient comes in today reporting a several week history of some heart palpitations and some dyspnea.  Patient is an extremely fit and healthy gentleman who exercises regularly and stays fit with his job at the [employer].  Patient reports over the last several weeks normal daily living such as running up stairs or carrying something into the house or even walking into my clinic today from the parking lot is causing shortness of breath with exertion and some heart palpitations.  He denies any chest pain however he does have strong family history of heart disease.  He is fairly active although does sit sometimes at his work.    No recent travels. No increased leg edema.   FH: dad with CAD/CABG. No clots reported.    The following portions of the patient's history were reviewed and updated as appropriate: allergies, current medications, past family history, past medical history, past social history, past surgical history and problem list.    Review of Systems   Constitutional: Negative for fever.   HENT: Negative for ear pain, rhinorrhea and sore throat.    Respiratory: Positive for shortness of breath. Negative for wheezing.    Cardiovascular: Positive for palpitations. Negative for chest pain and leg swelling.   Gastrointestinal: Negative for abdominal pain and vomiting.   Musculoskeletal: Negative for neck pain.   Skin: Negative for rash.   Neurological: Negative for headaches.       /69   Pulse 64   Temp 98.4 °F (36.9 °C) (Oral)   Resp 16   Ht 180.3 cm (71\")   Wt 87.5 kg (193 lb)   SpO2 96%   BMI 26.92 kg/m²     Objective   Physical Exam  Constitutional:       General: He is not in acute distress.     Appearance: He is well-developed.   Cardiovascular:      Rate and Rhythm: Normal rate and regular rhythm.   Pulmonary:      Effort: Pulmonary effort is normal.      Breath sounds: Normal breath " sounds.   Neurological:      Mental Status: He is alert and oriented to person, place, and time.   Psychiatric:         Behavior: Behavior normal.         Thought Content: Thought content normal.         Assessment & Plan   Diagnoses and all orders for this visit:    1. Heart palpitations (Primary)  -     Ambulatory Referral to Cardiology  -     Cancel: CT angiogram chest w contrast; Future  -     ECG 12 Lead  -     CT angiogram chest w contrast; Future    2. Dyspnea, unspecified type  -     Ambulatory Referral to Cardiology  -     Cancel: CT angiogram chest w contrast; Future  -     ECG 12 Lead  -     CT angiogram chest w contrast; Future

## 2023-04-26 ENCOUNTER — HOSPITAL ENCOUNTER (OUTPATIENT)
Dept: CARDIOLOGY | Facility: HOSPITAL | Age: 43
Discharge: HOME | End: 2023-04-26
Payer: COMMERCIAL

## 2023-04-26 ENCOUNTER — HOSPITAL ENCOUNTER (OUTPATIENT)
Dept: CT IMAGING | Facility: HOSPITAL | Age: 43
Discharge: HOME | End: 2023-04-26
Payer: COMMERCIAL

## 2023-04-26 VITALS
HEART RATE: 55 BPM | RESPIRATION RATE: 16 BRPM | DIASTOLIC BLOOD PRESSURE: 74 MMHG | SYSTOLIC BLOOD PRESSURE: 128 MMHG | OXYGEN SATURATION: 100 %

## 2023-04-26 DIAGNOSIS — R06.02 SHORTNESS OF BREATH: ICD-10-CM

## 2023-04-26 DIAGNOSIS — R06.00 DYSPNEA, UNSPECIFIED TYPE: ICD-10-CM

## 2023-04-26 DIAGNOSIS — R00.2 HEART PALPITATIONS: ICD-10-CM

## 2023-04-26 DIAGNOSIS — R07.2 PRECORDIAL PAIN: ICD-10-CM

## 2023-04-26 LAB
ALBUMIN SERPL-MCNC: 4.7 G/DL (ref 3.5–5.2)
ALBUMIN/GLOB SERPL: 2.5 G/DL
ALP SERPL-CCNC: 91 U/L (ref 39–117)
ALT SERPL W P-5'-P-CCNC: 36 U/L (ref 1–41)
ANION GAP SERPL CALCULATED.3IONS-SCNC: 9.4 MMOL/L (ref 5–15)
AST SERPL-CCNC: 22 U/L (ref 1–40)
BASOPHILS # BLD AUTO: 0.03 10*3/MM3 (ref 0–0.2)
BASOPHILS NFR BLD AUTO: 0.5 % (ref 0–1.5)
BILIRUB SERPL-MCNC: 0.4 MG/DL (ref 0–1.2)
BUN SERPL-MCNC: 16 MG/DL (ref 6–20)
BUN/CREAT SERPL: 15.5 (ref 7–25)
CALCIUM SPEC-SCNC: 9.7 MG/DL (ref 8.6–10.5)
CHLORIDE SERPL-SCNC: 103 MMOL/L (ref 98–107)
CO2 SERPL-SCNC: 28.6 MMOL/L (ref 22–29)
CREAT SERPL-MCNC: 1.03 MG/DL (ref 0.76–1.27)
DEPRECATED RDW RBC AUTO: 38.4 FL (ref 37–54)
EGFRCR SERPLBLD CKD-EPI 2021: 91.9 ML/MIN/1.73
EOSINOPHIL # BLD AUTO: 0.06 10*3/MM3 (ref 0–0.4)
EOSINOPHIL NFR BLD AUTO: 1 % (ref 0.3–6.2)
ERYTHROCYTE [DISTWIDTH] IN BLOOD BY AUTOMATED COUNT: 11.9 % (ref 12.3–15.4)
GLOBULIN UR ELPH-MCNC: 1.9 GM/DL
GLUCOSE SERPL-MCNC: 90 MG/DL (ref 65–99)
HCT VFR BLD AUTO: 43.5 % (ref 37.5–51)
HGB BLD-MCNC: 14.8 G/DL (ref 13–17.7)
IMM GRANULOCYTES # BLD AUTO: 0.01 10*3/MM3 (ref 0–0.05)
IMM GRANULOCYTES NFR BLD AUTO: 0.2 % (ref 0–0.5)
LYMPHOCYTES # BLD AUTO: 1.4 10*3/MM3 (ref 0.7–3.1)
LYMPHOCYTES NFR BLD AUTO: 22.3 % (ref 19.6–45.3)
MCH RBC QN AUTO: 30.5 PG (ref 26.6–33)
MCHC RBC AUTO-ENTMCNC: 34 G/DL (ref 31.5–35.7)
MCV RBC AUTO: 89.5 FL (ref 79–97)
MONOCYTES # BLD AUTO: 0.55 10*3/MM3 (ref 0.1–0.9)
MONOCYTES NFR BLD AUTO: 8.8 % (ref 5–12)
NEUTROPHILS NFR BLD AUTO: 4.23 10*3/MM3 (ref 1.7–7)
NEUTROPHILS NFR BLD AUTO: 67.2 % (ref 42.7–76)
NRBC BLD AUTO-RTO: 0 /100 WBC (ref 0–0.2)
NT-PROBNP SERPL-MCNC: 21.7 PG/ML (ref 0–450)
PLATELET # BLD AUTO: 228 10*3/MM3 (ref 140–450)
PMV BLD AUTO: 9.1 FL (ref 6–12)
POTASSIUM SERPL-SCNC: 4.1 MMOL/L (ref 3.5–5.2)
PROT SERPL-MCNC: 6.6 G/DL (ref 6–8.5)
RBC # BLD AUTO: 4.86 10*6/MM3 (ref 4.14–5.8)
SODIUM SERPL-SCNC: 141 MMOL/L (ref 136–145)
TROPONIN T SERPL HS-MCNC: <6 NG/L
WBC NRBC COR # BLD: 6.28 10*3/MM3 (ref 3.4–10.8)

## 2023-04-26 PROCEDURE — 71275 CT ANGIOGRAPHY CHEST: CPT

## 2023-04-26 PROCEDURE — 36415 COLL VENOUS BLD VENIPUNCTURE: CPT

## 2023-04-26 PROCEDURE — 94760 N-INVAS EAR/PLS OXIMETRY 1: CPT

## 2023-04-26 PROCEDURE — 25510000001 IOPAMIDOL PER 1 ML: Performed by: FAMILY MEDICINE

## 2023-04-26 PROCEDURE — 83880 ASSAY OF NATRIURETIC PEPTIDE: CPT | Performed by: INTERNAL MEDICINE

## 2023-04-26 PROCEDURE — 85025 COMPLETE CBC W/AUTO DIFF WBC: CPT

## 2023-04-26 PROCEDURE — 80053 COMPREHEN METABOLIC PANEL: CPT

## 2023-04-26 PROCEDURE — 84484 ASSAY OF TROPONIN QUANT: CPT | Performed by: INTERNAL MEDICINE

## 2023-04-26 RX ORDER — SODIUM CHLORIDE 0.9 % (FLUSH) 0.9 %
10 SYRINGE (ML) INJECTION AS NEEDED
Status: SHIPPED | OUTPATIENT
Start: 2023-04-26

## 2023-04-26 RX ORDER — NITROGLYCERIN 0.4 MG/1
0.4 TABLET SUBLINGUAL
Status: SHIPPED | OUTPATIENT
Start: 2023-04-26

## 2023-04-26 RX ADMIN — IOPAMIDOL 95 ML: 755 INJECTION, SOLUTION INTRAVENOUS at 12:14

## 2023-04-26 NOTE — PROGRESS NOTES
Subjective:     Encounter Date:04/26/2023      Patient ID: Mario East Jr. is a 43 y.o. male.    Chief Complaint: Shortness of breath  History of Present Illness    43-year-old gentleman who presents to the cardiac evaluation center complaining of palpitations and shortness of breath.  Patient says that he has been having intermittent episodes of shortness of breath that seem to be random in nature.  He said 1 time walking to the second floor office he got incredibly winded.  Patient however said recently he went on vacation in [place] and said he ran 16 miles and 3 days without any issues.  Patient says he was running 16 to 20 miles a week on a consistent basis up until about a year ago however with kids events and etc. he has not been training as much.  Patient says that in the past month the palpitations have been worse.  Patient underwent a CT scan today at [facility].  He was referred to the cardiac evaluation center for further evaluation.    ROS    Procedures       Objective:     Vitals reviewed.   Constitutional:       Appearance: Healthy appearance.   Pulmonary:      Effort: Pulmonary effort is normal.   Cardiovascular:      Normal rate. Regular rhythm. Normal S1. Normal S2.      Murmurs: There is no murmur.      No gallop. No click. No rub.   Pulses:     Intact distal pulses.   Edema:     Peripheral edema absent.   Neurological:      Mental Status: Alert and oriented to person, place and time.         Lab Review:       Assessment:          Diagnosis Plan   1. Precordial pain  Cardiac Monitoring    Vital Signs - Once    Vital Signs - As Needed    Pulse Oximetry    Oxygen Therapy- Nasal Cannula; Titrate for SPO2: 92%, equal to or greater than    Insert Peripheral IV    sodium chloride 0.9 % flush 10 mL    nitroglycerin (NITROSTAT) SL tablet 0.4 mg    NPO Diet NPO Type: Strict NPO    Bathroom Privileges With Assistance    CBC & Differential    Comprehensive Metabolic Panel    Troponin     proBNP    ECG 12 Lead    Cardiac Monitoring    Vital Signs - Once    Insert Peripheral IV    NPO Diet NPO Type: Strict NPO    Bathroom Privileges With Assistance    Troponin    Troponin    proBNP    proBNP    High Sensitivity Troponin T 2Hr    High Sensitivity Troponin T 2Hr      2. Shortness of breath  Cardiac Monitoring    Vital Signs - Once    Vital Signs - As Needed    Pulse Oximetry    Oxygen Therapy- Nasal Cannula; Titrate for SPO2: 92%, equal to or greater than    Insert Peripheral IV    sodium chloride 0.9 % flush 10 mL    nitroglycerin (NITROSTAT) SL tablet 0.4 mg    NPO Diet NPO Type: Strict NPO    Bathroom Privileges With Assistance    CBC & Differential    Comprehensive Metabolic Panel    Troponin    proBNP    ECG 12 Lead    Cardiac Monitoring    Vital Signs - Once    Insert Peripheral IV    NPO Diet NPO Type: Strict NPO    Bathroom Privileges With Assistance    Troponin    Troponin    proBNP    proBNP    High Sensitivity Troponin T 2Hr    High Sensitivity Troponin T 2Hr    Holter Monitor - 24 Hour    Adult Transthoracic Echo Complete W/ Cont if Necessary Per Protocol             Plan:       1.  Palpitations.  Patient's monitor here in the cardiac evaluation unit shows both PACs and PVCs.  He actually describes PVCs pretty good.  I am going to place a monitor on him because at one point he was having quite a few PVCs.  We will see how he is having a 24-hour period.  Patient is very active he actually talked about doing some mountain climbing this summer.  It is not unusual for people who are in excellent shape have the PVCs.  We will see how he is having a 24-hour period.  2.  Shortness of breath this was more elusive.  His CT scan done at [facility] did not show any pulmonary embolism but did reveal a pericardial cyst.  I will set him up to do an echocardiogram to assess the pericardial cyst.  Unfortunately he needed preauthorization prior to getting the studies so we deferred the echo today.  We will get  him scheduled along with a Holter.  The only concern would be if the pericardial cyst is compressing a cardiovascular structure which could be causing some of his symptoms.  Again we will see what the echo shows.

## 2023-04-26 NOTE — NURSING NOTE
Dr Linn called to report that he spoke with Dr Jung about this CT results. Patient may leave and go to his next appt with Cardiology.  IV DC - cath intact.

## 2023-05-09 ENCOUNTER — HOSPITAL ENCOUNTER (OUTPATIENT)
Dept: CARDIOLOGY | Facility: HOSPITAL | Age: 43
Discharge: HOME | End: 2023-05-09
Admitting: INTERNAL MEDICINE
Payer: COMMERCIAL

## 2023-05-09 VITALS
WEIGHT: 193 LBS | BODY MASS INDEX: 27.63 KG/M2 | HEART RATE: 70 BPM | HEIGHT: 70 IN | SYSTOLIC BLOOD PRESSURE: 122 MMHG | DIASTOLIC BLOOD PRESSURE: 76 MMHG

## 2023-05-09 LAB
AORTIC ARCH: 2.6 CM
ASCENDING AORTA: 2.7 CM
BH CV ECHO MEAS - ACS: 2.09 CM
BH CV ECHO MEAS - AO MAX PG: 8.6 MMHG
BH CV ECHO MEAS - AO MEAN PG: 4.5 MMHG
BH CV ECHO MEAS - AO ROOT DIAM: 2.6 CM
BH CV ECHO MEAS - AO V2 MAX: 146.6 CM/SEC
BH CV ECHO MEAS - AO V2 VTI: 33.5 CM
BH CV ECHO MEAS - AVA(I,D): 2.33 CM2
BH CV ECHO MEAS - EDV(CUBED): 95.6 ML
BH CV ECHO MEAS - EDV(MOD-SP2): 112 ML
BH CV ECHO MEAS - EDV(MOD-SP4): 114 ML
BH CV ECHO MEAS - EF(MOD-BP): 65.6 %
BH CV ECHO MEAS - EF(MOD-SP2): 61.6 %
BH CV ECHO MEAS - EF(MOD-SP4): 68.4 %
BH CV ECHO MEAS - ESV(CUBED): 15.4 ML
BH CV ECHO MEAS - ESV(MOD-SP2): 43 ML
BH CV ECHO MEAS - ESV(MOD-SP4): 36 ML
BH CV ECHO MEAS - FS: 45.7 %
BH CV ECHO MEAS - IVS/LVPW: 0.88 CM
BH CV ECHO MEAS - IVSD: 0.75 CM
BH CV ECHO MEAS - LAT PEAK E' VEL: 19.4 CM/SEC
BH CV ECHO MEAS - LV DIASTOLIC VOL/BSA (35-75): 54.9 CM2
BH CV ECHO MEAS - LV MASS(C)D: 116.2 GRAMS
BH CV ECHO MEAS - LV MAX PG: 6.8 MMHG
BH CV ECHO MEAS - LV MEAN PG: 2.9 MMHG
BH CV ECHO MEAS - LV SYSTOLIC VOL/BSA (12-30): 17.3 CM2
BH CV ECHO MEAS - LV V1 MAX: 130.6 CM/SEC
BH CV ECHO MEAS - LV V1 VTI: 24.5 CM
BH CV ECHO MEAS - LVIDD: 4.6 CM
BH CV ECHO MEAS - LVIDS: 2.49 CM
BH CV ECHO MEAS - LVOT AREA: 3.2 CM2
BH CV ECHO MEAS - LVOT DIAM: 2.02 CM
BH CV ECHO MEAS - LVPWD: 0.85 CM
BH CV ECHO MEAS - MED PEAK E' VEL: 14.1 CM/SEC
BH CV ECHO MEAS - MR MAX PG: 40.7 MMHG
BH CV ECHO MEAS - MR MAX VEL: 319 CM/SEC
BH CV ECHO MEAS - MV A DUR: 0.15 SEC
BH CV ECHO MEAS - MV A MAX VEL: 56.1 CM/SEC
BH CV ECHO MEAS - MV DEC SLOPE: 440.6 CM/SEC2
BH CV ECHO MEAS - MV DEC TIME: 0.2 MSEC
BH CV ECHO MEAS - MV E MAX VEL: 93.8 CM/SEC
BH CV ECHO MEAS - MV E/A: 1.67
BH CV ECHO MEAS - MV MAX PG: 3.7 MMHG
BH CV ECHO MEAS - MV MEAN PG: 1.51 MMHG
BH CV ECHO MEAS - MV P1/2T: 59.6 MSEC
BH CV ECHO MEAS - MV V2 VTI: 26.9 CM
BH CV ECHO MEAS - MVA(P1/2T): 3.7 CM2
BH CV ECHO MEAS - MVA(VTI): 2.9 CM2
BH CV ECHO MEAS - PA ACC TIME: 0.19 SEC
BH CV ECHO MEAS - PA PR(ACCEL): -5.4 MMHG
BH CV ECHO MEAS - PA V2 MAX: 103.2 CM/SEC
BH CV ECHO MEAS - PULM A REVS DUR: 0.13 SEC
BH CV ECHO MEAS - PULM A REVS VEL: 29.4 CM/SEC
BH CV ECHO MEAS - PULM DIAS VEL: 43 CM/SEC
BH CV ECHO MEAS - PULM S/D: 1.41
BH CV ECHO MEAS - PULM SYS VEL: 60.8 CM/SEC
BH CV ECHO MEAS - QP/QS: 0.83
BH CV ECHO MEAS - RAP SYSTOLE: 3 MMHG
BH CV ECHO MEAS - RV MAX PG: 3.6 MMHG
BH CV ECHO MEAS - RV V1 MAX: 95.1 CM/SEC
BH CV ECHO MEAS - RV V1 VTI: 21.2 CM
BH CV ECHO MEAS - RVOT DIAM: 1.97 CM
BH CV ECHO MEAS - RVSP: 20 MMHG
BH CV ECHO MEAS - SI(MOD-SP2): 33.2 ML/M2
BH CV ECHO MEAS - SI(MOD-SP4): 37.6 ML/M2
BH CV ECHO MEAS - SUP REN AO DIAM: 2.2 CM
BH CV ECHO MEAS - SV(LVOT): 78.2 ML
BH CV ECHO MEAS - SV(MOD-SP2): 69 ML
BH CV ECHO MEAS - SV(MOD-SP4): 78 ML
BH CV ECHO MEAS - SV(RVOT): 64.9 ML
BH CV ECHO MEAS - TAPSE (>1.6): 3 CM
BH CV ECHO MEAS - TR MAX PG: 17 MMHG
BH CV ECHO MEAS - TR MAX VEL: 206.3 CM/SEC
BH CV ECHO MEASUREMENTS AVERAGE E/E' RATIO: 5.6
BH CV XLRA - RV BASE: 2.9 CM
BH CV XLRA - RV LENGTH: 9.4 CM
BH CV XLRA - RV MID: 1.97 CM
BH CV XLRA - TDI S': 15.9 CM/SEC
LEFT ATRIUM VOLUME INDEX: 24.7 ML/M2
MAXIMAL PREDICTED HEART RATE: 176 BPM
SINUS: 2.46 CM
STJ: 2.8 CM
STRESS TARGET HR: 150 BPM

## 2023-05-09 PROCEDURE — 93306 TTE W/DOPPLER COMPLETE: CPT | Performed by: INTERNAL MEDICINE

## 2023-05-09 PROCEDURE — 93306 TTE W/DOPPLER COMPLETE: CPT

## 2023-09-11 NOTE — PROGRESS NOTES
Chief Complaint: No chief complaint on file.      Mario East Jr. 43 y.o. male who presents today for Medical Management of the below listed issues. He  has a problem list of   Patient Active Problem List   Diagnosis    Varicose vein of leg    Cellulitis of right anterior lower leg - traumatic    Back pain    Cellulitis of right leg without foot    Nonhealing surgical wound, subsequent encounter    Traumatic open wound of right lower leg    Shift work sleep disorder    Spinal stenosis of lumbar region without neurogenic claudication    Appendicitis    Insomnia    Pure hypercholesterolemia    Family history of coronary arteriosclerosis   .  Since the last visit, He has overall felt well.  he has been compliant with   Current Outpatient Medications:     atorvastatin (Lipitor) 10 MG tablet, Take 1 tablet by mouth Daily., Disp: 90 tablet, Rfl: 3    fluticasone (FLONASE) 50 MCG/ACT nasal spray, , Disp: , Rfl:     loratadine (CLARITIN) 10 MG tablet, , Disp: , Rfl:     modafinil (Provigil) 100 MG tablet, Take 1 tablet by mouth Daily. (Patient not taking: Reported on 4/26/2023), Disp: 30 tablet, Rfl: 2    zolpidem (AMBIEN) 10 MG tablet, Take 1 tablet by mouth At Night As Needed for Sleep., Disp: 30 tablet, Rfl: 2    Current Facility-Administered Medications:     nitroglycerin (NITROSTAT) SL tablet 0.4 mg, 0.4 mg, Sublingual, Q5 Min PRN, Ciaran Marcelino MD    sodium chloride 0.9 % flush 10 mL, 10 mL, Intravenous, PRN, Ciaran Marcelino MD.  He denies medication side effects.    All of the other chronic condition(s) listed above are stable w/o issues.    There were no vitals taken for this visit.    Results for orders placed or performed during the hospital encounter of 05/09/23   Holter Monitor - 24 Hour   Result Value Ref Range    Target HR (85%) 150 bpm    Max. Pred. HR (100%) 176 bpm             The following portions of the patient's history were reviewed and updated as appropriate: allergies, current  medications, past family history, past medical history, past social history, past surgical history, and problem list.    Review of Systems   Constitutional:  Negative for activity change, chills and fever.   Respiratory:  Negative for cough.    Cardiovascular:  Negative for chest pain.   Psychiatric/Behavioral:  Negative for dysphoric mood.      Objective     {BMI is >= 25 and <30. (Overweight) The following options were offered after discussion; (Optional):08098}       Physical Exam  Constitutional:       General: He is not in acute distress.     Appearance: He is well-developed.   Cardiovascular:      Rate and Rhythm: Normal rate and regular rhythm.   Pulmonary:      Effort: Pulmonary effort is normal.      Breath sounds: Normal breath sounds.   Neurological:      Mental Status: He is alert and oriented to person, place, and time.   Psychiatric:         Behavior: Behavior normal.         Thought Content: Thought content normal.           There are no diagnoses linked to this encounter.

## 2023-09-12 ENCOUNTER — TELEMEDICINE (OUTPATIENT)
Dept: FAMILY MEDICINE CLINIC | Facility: CLINIC | Age: 43
End: 2023-09-12
Payer: COMMERCIAL

## 2023-09-12 VITALS — BODY MASS INDEX: 27.2 KG/M2 | HEIGHT: 70 IN | WEIGHT: 190 LBS

## 2023-09-12 DIAGNOSIS — F41.8 SITUATIONAL ANXIETY: ICD-10-CM

## 2023-09-12 DIAGNOSIS — J06.9 UPPER RESPIRATORY TRACT INFECTION DUE TO COVID-19 VIRUS: Primary | ICD-10-CM

## 2023-09-12 DIAGNOSIS — U07.1 UPPER RESPIRATORY TRACT INFECTION DUE TO COVID-19 VIRUS: Primary | ICD-10-CM

## 2023-09-12 DIAGNOSIS — E78.00 PURE HYPERCHOLESTEROLEMIA: ICD-10-CM

## 2023-09-12 PROCEDURE — 99214 OFFICE O/P EST MOD 30 MIN: CPT | Performed by: FAMILY MEDICINE

## 2023-09-12 RX ORDER — MONTELUKAST SODIUM 10 MG/1
10 TABLET ORAL NIGHTLY
Qty: 14 TABLET | Refills: 0 | Status: SHIPPED | OUTPATIENT
Start: 2023-09-12 | End: 2023-09-26

## 2023-09-12 RX ORDER — PRAVASTATIN SODIUM 40 MG
40 TABLET ORAL DAILY
Qty: 90 TABLET | Refills: 3 | Status: SHIPPED | OUTPATIENT
Start: 2023-09-12

## 2023-09-12 RX ORDER — AZITHROMYCIN 250 MG/1
TABLET, FILM COATED ORAL
Qty: 6 TABLET | Refills: 0 | Status: SHIPPED | OUTPATIENT
Start: 2023-09-12

## 2023-09-12 RX ORDER — PROPRANOLOL HYDROCHLORIDE 10 MG/1
10 TABLET ORAL 2 TIMES DAILY PRN
Qty: 60 TABLET | Refills: 5 | Status: SHIPPED | OUTPATIENT
Start: 2023-09-12

## 2023-09-12 NOTE — PROGRESS NOTES
Subjective   Mario East Jr. is a 43 y.o. male.     CC: VV for COVID    Patient seen today on a video visit reporting testing positive for COVID 2 days ago. Sx are fatigue, some aches, and nasal congestion. Pt is feeling better today.    Secondly, pt reports wanting something for prn anxiety.    Lastly, pt can't tolerate the Lipitor due to myalgias and needs a replacement.    The following portions of the patient's history were reviewed and updated as appropriate: allergies, current medications, past family history, past medical history, past social history, past surgical history, and problem list.    Review of Systems   Constitutional:  Positive for fatigue. Negative for activity change, chills and fever.   Respiratory:  Negative for cough.    Cardiovascular:  Negative for chest pain.   Psychiatric/Behavioral:  Negative for dysphoric mood.      Objective   Physical Exam  Constitutional:       General: He is not in acute distress.     Appearance: He is well-developed.   Pulmonary:      Effort: Pulmonary effort is normal.   Neurological:      Mental Status: He is alert and oriented to person, place, and time.   Psychiatric:         Behavior: Behavior normal.         Thought Content: Thought content normal.       Assessment & Plan   Diagnoses and all orders for this visit:    1. Upper respiratory tract infection due to COVID-19 virus (Primary)  -     azithromycin (Zithromax Z-Praneeth) 250 MG tablet; Take 2 tablets the first day, then 1 tablet daily for 4 days.  Dispense: 6 tablet; Refill: 0  -     montelukast (Singulair) 10 MG tablet; Take 1 tablet by mouth Every Night for 14 days.  Dispense: 14 tablet; Refill: 0    2. Pure hypercholesterolemia  -     pravastatin (Pravachol) 40 MG tablet; Take 1 tablet by mouth Daily.  Dispense: 90 tablet; Refill: 3    3. Situational anxiety  -     propranolol (INDERAL) 10 MG tablet; Take 1 tablet by mouth 2 (Two) Times a Day As Needed (anxiety).  Dispense: 60 tablet; Refill:  5    Spent  12   minutes with chart and interview and consent for this encounter given by the patient..  You have chosen to receive care through a telehealth visit.  Do you consent to use a video/audio connection for your medical care today? Yes  Patient was in their residence when this visit took place and I was in my medical office.

## 2023-10-16 DIAGNOSIS — G47.00 INSOMNIA, UNSPECIFIED TYPE: Chronic | ICD-10-CM

## 2023-10-17 RX ORDER — ZOLPIDEM TARTRATE 10 MG/1
10 TABLET ORAL NIGHTLY PRN
Qty: 30 TABLET | Refills: 0 | OUTPATIENT
Start: 2023-10-17

## 2023-11-27 ENCOUNTER — TELEPHONE (OUTPATIENT)
Dept: CARDIOLOGY | Facility: CLINIC | Age: 43
End: 2023-11-27

## 2023-11-27 NOTE — TELEPHONE ENCOUNTER
Caller: Mario East Jr.    Relationship to patient: Self    Best call back number: [phone]     Chief complaint: ABN ECG    Type of visit: FU    Requested date: AS NEEDED     If rescheduling, when is the original appointment: NA     Additional notes: PT STATES HE HAS A PICTURE OF AN ECG THAT HE IS SENDING VIA [facility] - PT IS WANTING TO SEE ABOUT GETTING AN APPT AS HE HAS A RECENT DX OF HEART DISEASE AND HE WANTED TO INQUIRE INTO THE TESTING - PT HAD INCOMPLETE NEW PT APPT BUT ALSO HAD PN NOTES ON HOLTER MONITOR RESULTS WITH DR CHAMBERS AND  VILMA RODRIGEZ

## 2023-12-13 ENCOUNTER — OFFICE VISIT (OUTPATIENT)
Dept: FAMILY MEDICINE CLINIC | Facility: CLINIC | Age: 43
End: 2023-12-13
Payer: COMMERCIAL

## 2023-12-13 VITALS
TEMPERATURE: 97.5 F | DIASTOLIC BLOOD PRESSURE: 76 MMHG | HEIGHT: 70 IN | OXYGEN SATURATION: 99 % | RESPIRATION RATE: 16 BRPM | WEIGHT: 194 LBS | SYSTOLIC BLOOD PRESSURE: 139 MMHG | HEART RATE: 64 BPM | BODY MASS INDEX: 27.77 KG/M2

## 2023-12-13 DIAGNOSIS — R45.86 MOOD CHANGES: ICD-10-CM

## 2023-12-13 DIAGNOSIS — G47.00 INSOMNIA, UNSPECIFIED TYPE: Primary | Chronic | ICD-10-CM

## 2023-12-13 DIAGNOSIS — E78.00 PURE HYPERCHOLESTEROLEMIA: Chronic | ICD-10-CM

## 2023-12-13 DIAGNOSIS — H93.12 TINNITUS AURIUM, LEFT: ICD-10-CM

## 2023-12-13 PROCEDURE — 99214 OFFICE O/P EST MOD 30 MIN: CPT | Performed by: FAMILY MEDICINE

## 2023-12-13 RX ORDER — ROSUVASTATIN CALCIUM 5 MG/1
5 TABLET, COATED ORAL DAILY
Qty: 90 TABLET | Refills: 3 | Status: SHIPPED | OUTPATIENT
Start: 2023-12-13

## 2023-12-13 RX ORDER — ZOLPIDEM TARTRATE 10 MG/1
10 TABLET ORAL NIGHTLY PRN
Qty: 30 TABLET | Refills: 2 | Status: SHIPPED | OUTPATIENT
Start: 2023-12-13

## 2023-12-13 RX ORDER — BUPROPION HYDROCHLORIDE 150 MG/1
150 TABLET ORAL DAILY
Qty: 90 TABLET | Refills: 1 | Status: SHIPPED | OUTPATIENT
Start: 2023-12-13

## 2023-12-13 RX ORDER — ATORVASTATIN CALCIUM 10 MG/1
10 TABLET, FILM COATED ORAL DAILY
COMMUNITY
End: 2023-12-13

## 2023-12-13 NOTE — PROGRESS NOTES
"  Chief Complaint:   Chief Complaint   Patient presents with    Tinnitus     X 1 year / worse last week     Insomnia     Med refill     Hyperlipidemia     To discuss medications        Mario East Jr. 43 y.o. male who presents today for Medical Management of the below listed issues. He  has a problem list of   Patient Active Problem List   Diagnosis    Varicose vein of leg    Cellulitis of right anterior lower leg - traumatic    Back pain    Cellulitis of right leg without foot    Nonhealing surgical wound, subsequent encounter    Traumatic open wound of right lower leg    Shift work sleep disorder    Spinal stenosis of lumbar region    Appendicitis    Insomnia    Pure hypercholesterolemia    Family history of coronary arteriosclerosis   .  Since the last visit, He has had some ringing of the left ear for about a year or so, which seem to have gotten a little worse last week. The ringing is always there since last Friday. Pt reports his brain has felt like he's \"on\" for several years and reports some decrease in desire to be around people and being somewhat \"short\" with his mood. Pt in an [employer] and used to work the [employer], and has some PTSD issues (6 months of counseling prior).  Patient also had had some myalgia issues on Lipitor, and had to stop that due to those symptoms. Pt has an appt next week with ENT. he has been compliant with   Current Outpatient Medications:     zolpidem (AMBIEN) 10 MG tablet, Take 1 tablet by mouth At Night As Needed for Sleep., Disp: 30 tablet, Rfl: 2    buPROPion XL (Wellbutrin XL) 150 MG 24 hr tablet, Take 1 tablet by mouth Daily., Disp: 90 tablet, Rfl: 1    fluticasone (FLONASE) 50 MCG/ACT nasal spray, , Disp: , Rfl:     loratadine (CLARITIN) 10 MG tablet, , Disp: , Rfl:     propranolol (INDERAL) 10 MG tablet, Take 1 tablet by mouth 2 (Two) Times a Day As Needed (anxiety)., Disp: 60 tablet, Rfl: 5    rosuvastatin (Crestor) 5 MG tablet, Take 1 tablet by " "mouth Daily., Disp: 90 tablet, Rfl: 3    Current Facility-Administered Medications:     nitroglycerin (NITROSTAT) SL tablet 0.4 mg, 0.4 mg, Sublingual, Q5 Min PRN, Ciaran Marcelino MD    sodium chloride 0.9 % flush 10 mL, 10 mL, Intravenous, PRN, Ciaran Marcelino MD.  He denies medication side effects.    All of the other chronic condition(s) listed above are stable w/o issues.    /76   Pulse 64   Temp 97.5 °F (36.4 °C) (Oral)   Resp 16   Ht 177.8 cm (70\")   Wt 88 kg (194 lb)   SpO2 99%   BMI 27.84 kg/m²     Results for orders placed or performed during the hospital encounter of 05/09/23   Holter Monitor - 24 Hour   Result Value Ref Range    Target HR (85%) 150 bpm    Max. Pred. HR (100%) 176 bpm             The following portions of the patient's history were reviewed and updated as appropriate: allergies, current medications, past family history, past medical history, past social history, past surgical history, and problem list.    Review of Systems   Constitutional:  Negative for activity change, chills and fever.   HENT:  Positive for ear pain and tinnitus.    Respiratory:  Negative for cough.    Cardiovascular:  Negative for chest pain.   Psychiatric/Behavioral:  Negative for dysphoric mood.        Objective            Physical Exam  Constitutional:       General: He is not in acute distress.     Appearance: He is well-developed.   HENT:      Right Ear: Tympanic membrane and ear canal normal.      Left Ear: Tympanic membrane and ear canal normal.   Pulmonary:      Effort: Pulmonary effort is normal.   Neurological:      Mental Status: He is alert and oriented to person, place, and time.   Psychiatric:         Behavior: Behavior normal.         Thought Content: Thought content normal.             Diagnoses and all orders for this visit:    1. Insomnia, unspecified type (Primary)  -     zolpidem (AMBIEN) 10 MG tablet; Take 1 tablet by mouth At Night As Needed for Sleep.  Dispense: 30 tablet; " Refill: 2    2. Pure hypercholesterolemia  -     rosuvastatin (Crestor) 5 MG tablet; Take 1 tablet by mouth Daily.  Dispense: 90 tablet; Refill: 3    3. Mood changes  -     buPROPion XL (Wellbutrin XL) 150 MG 24 hr tablet; Take 1 tablet by mouth Daily.  Dispense: 90 tablet; Refill: 1    4. Tinnitus aurium, left  -     MRI Brain Without Contrast; Future

## 2023-12-13 NOTE — PROGRESS NOTES
Subjective   Mario East Jr. is a 43 y.o. male.     CC: Ear Ringing    History of Present Illness     Pt comes in today reporting ringing in his left ear       The following portions of the patient's history were reviewed and updated as appropriate: allergies, current medications, past family history, past medical history, past social history, past surgical history, and problem list.    Review of Systems   Constitutional:  Negative for activity change, chills and fever.   HENT:  Positive for ear pain. Negative for ear discharge, hearing loss, rhinorrhea and sore throat.    Respiratory:  Negative for cough.    Cardiovascular:  Negative for chest pain.   Gastrointestinal:  Negative for abdominal pain, diarrhea and vomiting.   Musculoskeletal:  Negative for neck pain.   Skin:  Negative for rash.   Neurological:  Negative for headaches.   Psychiatric/Behavioral:  Negative for dysphoric mood.        There were no vitals taken for this visit.    Objective   Physical Exam  Constitutional:       General: He is not in acute distress.     Appearance: He is well-developed.   Pulmonary:      Effort: Pulmonary effort is normal.   Neurological:      Mental Status: He is alert and oriented to person, place, and time.   Psychiatric:         Behavior: Behavior normal.         Thought Content: Thought content normal.         Assessment & Plan   There are no diagnoses linked to this encounter.

## 2023-12-19 ENCOUNTER — TELEPHONE (OUTPATIENT)
Dept: FAMILY MEDICINE CLINIC | Facility: CLINIC | Age: 43
End: 2023-12-19

## 2023-12-19 NOTE — TELEPHONE ENCOUNTER
Hub staff attempted to follow warm transfer process and was unsuccessful     Caller: Veronica East    Relationship to patient: Emergency Contact    Best call back number: [phone]    Patient is needing: THE PATIENT'S WIFE CALLED IN TO ASK IF THE OFFICE HAS RECEIVED THE PATIENT'S RECENT CT SCAN RESULTS FROM [facility]. THESE RESULTS WERE FAXED OVER TODAY. THE OFFICE STATED THAT THERE HAS BEEN ISSUES WITH THE FAX MACHINE. THE PATIENT IS NOT SURE HOW TO GET THESE RESULTS TO DR. HERNANDEZ. PLEASE ADVISE.

## 2024-02-07 ENCOUNTER — PRIOR AUTHORIZATION (OUTPATIENT)
Dept: FAMILY MEDICINE CLINIC | Facility: CLINIC | Age: 44
End: 2024-02-07
Payer: COMMERCIAL

## 2024-02-07 NOTE — TELEPHONE ENCOUNTER
MELISSA CHANDLER JR (Key: [id number])  PA Case ID #: [id number]  Rx #: [id number]  - PRIOR AUTH PENDING FOR ZOLPIDEM TARTRATE 10 MG TABLETS

## 2024-02-12 NOTE — TELEPHONE ENCOUNTER
Message sent through my chart. Ok for hub to relay message -  Let him know that he could possibly use good Rx for this and/or pay cash and it should be quite affordable.

## 2024-03-27 ENCOUNTER — OFFICE VISIT (OUTPATIENT)
Dept: FAMILY MEDICINE CLINIC | Facility: CLINIC | Age: 44
End: 2024-03-27
Payer: COMMERCIAL

## 2024-03-27 VITALS
RESPIRATION RATE: 14 BRPM | BODY MASS INDEX: 26.77 KG/M2 | TEMPERATURE: 97.7 F | WEIGHT: 187 LBS | HEART RATE: 60 BPM | DIASTOLIC BLOOD PRESSURE: 72 MMHG | OXYGEN SATURATION: 99 % | HEIGHT: 70 IN | SYSTOLIC BLOOD PRESSURE: 115 MMHG

## 2024-03-27 DIAGNOSIS — K64.0 GRADE I HEMORRHOIDS: Primary | ICD-10-CM

## 2024-03-27 DIAGNOSIS — Z12.11 SPECIAL SCREENING FOR MALIGNANT NEOPLASMS, COLON: ICD-10-CM

## 2024-03-27 DIAGNOSIS — Z12.12 SCREENING FOR MALIGNANT NEOPLASM OF THE RECTUM: ICD-10-CM

## 2024-03-27 DIAGNOSIS — Z00.00 ANNUAL PHYSICAL EXAM: Primary | ICD-10-CM

## 2024-03-27 RX ORDER — HYDROCODONE BITARTRATE AND ACETAMINOPHEN 7.5; 325 MG/1; MG/1
TABLET ORAL
COMMUNITY
Start: 2024-02-05

## 2024-03-27 NOTE — PROGRESS NOTES
"Subjective   Mario East Jr. is a 44 y.o. male.     CC: Hemorrhoids    Pt has a recurrence of an external hemorrhoid than just came back and tried Prep-H and wishes to get it back down. No pain/bleeding/itching.  No BM issues. Does exercise 5 days/week.    The following portions of the patient's history were reviewed and updated as appropriate: allergies, current medications, past family history, past medical history, past social history, past surgical history, and problem list.    Review of Systems   Constitutional:  Negative for activity change, chills and fever.   Respiratory:  Negative for cough.    Cardiovascular:  Negative for chest pain.   Psychiatric/Behavioral:  Negative for dysphoric mood.        /72   Pulse 60   Temp 97.7 °F (36.5 °C) (Oral)   Resp 14   Ht 177.8 cm (70\")   Wt 84.8 kg (187 lb)   SpO2 99%   BMI 26.83 kg/m²     Objective   Physical Exam  Constitutional:       General: He is not in acute distress.     Appearance: He is well-developed.   Pulmonary:      Effort: Pulmonary effort is normal.   Genitourinary:         Comments: Small, non-thrombosed hemorrhoid noted  Neurological:      Mental Status: He is alert and oriented to person, place, and time.   Psychiatric:         Behavior: Behavior normal.         Thought Content: Thought content normal.         Assessment & Plan   Diagnoses and all orders for this visit:    1. Grade I hemorrhoids (Primary)  Comments:  Topical prescription sent to [facility] to treat.    2. Special screening for malignant neoplasms, colon  -     Cologuard - Stool, Per Rectum; Future    3. Screening for malignant neoplasm of the rectum  -     Cologuard - Stool, Per Rectum; Future                 "

## 2024-05-16 DIAGNOSIS — G47.00 INSOMNIA, UNSPECIFIED TYPE: Chronic | ICD-10-CM

## 2024-05-18 RX ORDER — ZOLPIDEM TARTRATE 10 MG/1
10 TABLET ORAL NIGHTLY PRN
Qty: 30 TABLET | Refills: 0 | Status: SHIPPED | OUTPATIENT
Start: 2024-05-18

## 2024-08-02 DIAGNOSIS — G47.00 INSOMNIA, UNSPECIFIED TYPE: Chronic | ICD-10-CM

## 2024-08-02 RX ORDER — ZOLPIDEM TARTRATE 10 MG/1
10 TABLET ORAL NIGHTLY PRN
Qty: 30 TABLET | OUTPATIENT
Start: 2024-08-02

## 2024-08-04 NOTE — PROGRESS NOTES
Chief Complaint:   Chief Complaint   Patient presents with    Insomnia     MED REFILL DUE    left lower leg pain      To discuss cholesterol med . Pt stopped taking this   No injury / ?         Mario East . 44 y.o. male who presents today for Medical Management of the below listed issues. He  has a problem list of   Patient Active Problem List   Diagnosis    Varicose vein of leg    Cellulitis of right anterior lower leg - traumatic    Back pain    Cellulitis of right leg without foot    Nonhealing surgical wound, subsequent encounter    Traumatic open wound of right lower leg    Shift work sleep disorder    Spinal stenosis of lumbar region    Appendicitis    Insomnia    Pure hypercholesterolemia    Family history of coronary arteriosclerosis   .  Since the last visit, He has sent this pre-visit note:    Left achilles tendon pain and discuss cholesterol injection medication.  Patient is a physically active gentleman who does some running, about 5 miles per week, and does workout on the [employer] bike.  Patient reports a left greater than right lower Achilles pain issue for at least 3 months, which worsened markedly this past weekend when having to do a PT test for his work at the [employer].  Since that time he has taken a break from running and reports some improvement.  He also is holding his Crestor considering that this may be a cause of some of his discomfort.              he has been compliant with   Current Outpatient Medications:     propranolol (INDERAL) 10 MG tablet, Take 1 tablet by mouth 2 (Two) Times a Day As Needed (anxiety)., Disp: 60 tablet, Rfl: 5    zolpidem (AMBIEN) 10 MG tablet, Take 1 tablet by mouth At Night As Needed for Sleep., Disp: 30 tablet, Rfl: 2    diclofenac (VOLTAREN) 75 MG EC tablet, Take 1 tablet by mouth 2 (Two) Times a Day., Disp: 60 tablet, Rfl: 2    fluticasone (FLONASE) 50 MCG/ACT nasal spray, , Disp: , Rfl:     loratadine (CLARITIN) 10 MG tablet, , Disp: , Rfl:      "methylPREDNISolone (Medrol) 4 MG dose pack, follow package directions, Disp: 21 tablet, Rfl: 0    rosuvastatin (Crestor) 5 MG tablet, Take 1 tablet by mouth Daily. (Patient not taking: Reported on 8/5/2024), Disp: 90 tablet, Rfl: 3    Current Facility-Administered Medications:     nitroglycerin (NITROSTAT) SL tablet 0.4 mg, 0.4 mg, Sublingual, Q5 Min PRN, Ciaran Marcelino MD    sodium chloride 0.9 % flush 10 mL, 10 mL, Intravenous, PRN, Ciaran Marcelino MD.  He denies medication side effects.    All of the other chronic condition(s) listed above are stable w/o issues.    /64   Pulse 60   Temp 97.9 °F (36.6 °C) (Oral)   Resp 14   Ht 177.8 cm (70\")   Wt 83.5 kg (184 lb)   SpO2 99%   BMI 26.40 kg/m²     Results for orders placed or performed in visit on 03/27/24   Cologuard - Stool, Per Rectum    Specimen: Per Rectum; Stool   Result Value Ref Range    Cologuard Negative Negative             The following portions of the patient's history were reviewed and updated as appropriate: allergies, current medications, past family history, past medical history, past social history, past surgical history, and problem list.    Review of Systems   Constitutional:  Negative for activity change, chills and fever.   Respiratory:  Negative for cough.    Cardiovascular:  Negative for chest pain.   Psychiatric/Behavioral:  Negative for dysphoric mood.        Objective             Physical Exam  Vitals and nursing note reviewed.   Constitutional:       General: He is not in acute distress.     Appearance: He is well-developed.   Cardiovascular:      Rate and Rhythm: Normal rate and regular rhythm.   Pulmonary:      Effort: Pulmonary effort is normal.      Breath sounds: Normal breath sounds.   Musculoskeletal:         General: Tenderness (of the mid lower left greater than right Achilles tendons.) present.   Neurological:      Mental Status: He is alert and oriented to person, place, and time.   Psychiatric:         " Behavior: Behavior normal.         Thought Content: Thought content normal.     Labs from 3/24 not yet completed and pt asked to do so.        Diagnoses and all orders for this visit:    1. Achilles bursitis of both lower extremities (Primary)  -     methylPREDNISolone (Medrol) 4 MG dose pack; follow package directions  Dispense: 21 tablet; Refill: 0  -     diclofenac (VOLTAREN) 75 MG EC tablet; Take 1 tablet by mouth 2 (Two) Times a Day.  Dispense: 60 tablet; Refill: 2    2. Pure hypercholesterolemia  Comments:  Will continue to hold cholesterol medicine at this time to see if removal of this makes a difference in his pain issues.    3. Insomnia, unspecified type  -     zolpidem (AMBIEN) 10 MG tablet; Take 1 tablet by mouth At Night As Needed for Sleep.  Dispense: 30 tablet; Refill: 2    4. Medication management  -     ToxAssure Flex 15, Ur -    5. Situational anxiety  -     propranolol (INDERAL) 10 MG tablet; Take 1 tablet by mouth 2 (Two) Times a Day As Needed (anxiety).  Dispense: 60 tablet; Refill: 5    Strongly encourage patient to transition from being a runner to more of a cyclist/swimmer/aerodyne bike user.  Will treat accordingly, and will consider using podiatry in the future if necessary.

## 2024-08-05 ENCOUNTER — OFFICE VISIT (OUTPATIENT)
Dept: FAMILY MEDICINE CLINIC | Facility: CLINIC | Age: 44
End: 2024-08-05
Payer: COMMERCIAL

## 2024-08-05 VITALS
HEART RATE: 60 BPM | DIASTOLIC BLOOD PRESSURE: 64 MMHG | RESPIRATION RATE: 14 BRPM | HEIGHT: 70 IN | OXYGEN SATURATION: 99 % | TEMPERATURE: 97.9 F | WEIGHT: 184 LBS | SYSTOLIC BLOOD PRESSURE: 116 MMHG | BODY MASS INDEX: 26.34 KG/M2

## 2024-08-05 DIAGNOSIS — M76.61 ACHILLES BURSITIS OF BOTH LOWER EXTREMITIES: Primary | ICD-10-CM

## 2024-08-05 DIAGNOSIS — Z79.899 MEDICATION MANAGEMENT: ICD-10-CM

## 2024-08-05 DIAGNOSIS — G47.00 INSOMNIA, UNSPECIFIED TYPE: Chronic | ICD-10-CM

## 2024-08-05 DIAGNOSIS — E78.00 PURE HYPERCHOLESTEROLEMIA: ICD-10-CM

## 2024-08-05 DIAGNOSIS — M76.62 ACHILLES BURSITIS OF BOTH LOWER EXTREMITIES: Primary | ICD-10-CM

## 2024-08-05 DIAGNOSIS — F41.8 SITUATIONAL ANXIETY: ICD-10-CM

## 2024-08-05 PROCEDURE — 99214 OFFICE O/P EST MOD 30 MIN: CPT | Performed by: FAMILY MEDICINE

## 2024-08-05 RX ORDER — ZOLPIDEM TARTRATE 10 MG/1
10 TABLET ORAL NIGHTLY PRN
Qty: 30 TABLET | Refills: 2 | Status: SHIPPED | OUTPATIENT
Start: 2024-08-05

## 2024-08-05 RX ORDER — DICLOFENAC SODIUM 75 MG/1
75 TABLET, DELAYED RELEASE ORAL 2 TIMES DAILY
Qty: 60 TABLET | Refills: 2 | Status: SHIPPED | OUTPATIENT
Start: 2024-08-05

## 2024-08-05 RX ORDER — METHYLPREDNISOLONE 4 MG/1
TABLET ORAL
Qty: 21 TABLET | Refills: 0 | Status: SHIPPED | OUTPATIENT
Start: 2024-08-05

## 2024-08-05 RX ORDER — PROPRANOLOL HYDROCHLORIDE 10 MG/1
10 TABLET ORAL 2 TIMES DAILY PRN
Qty: 60 TABLET | Refills: 5 | Status: SHIPPED | OUTPATIENT
Start: 2024-08-05

## 2024-08-17 LAB
ALBUMIN SERPL-MCNC: 4.9 G/DL (ref 4.1–5.1)
ALP SERPL-CCNC: 89 IU/L (ref 44–121)
ALT SERPL-CCNC: 39 IU/L (ref 0–44)
AST SERPL-CCNC: 24 IU/L (ref 0–40)
BASOPHILS # BLD AUTO: 0 X10E3/UL (ref 0–0.2)
BASOPHILS NFR BLD AUTO: 0 %
BILIRUB SERPL-MCNC: 0.4 MG/DL (ref 0–1.2)
BUN SERPL-MCNC: 22 MG/DL (ref 6–24)
BUN/CREAT SERPL: 17 (ref 9–20)
CALCIUM SERPL-MCNC: 10.3 MG/DL (ref 8.7–10.2)
CHLORIDE SERPL-SCNC: 102 MMOL/L (ref 96–106)
CHOLEST SERPL-MCNC: 244 MG/DL (ref 100–199)
CO2 SERPL-SCNC: 23 MMOL/L (ref 20–29)
CREAT SERPL-MCNC: 1.27 MG/DL (ref 0.76–1.27)
EGFRCR SERPLBLD CKD-EPI 2021: 71 ML/MIN/1.73
EOSINOPHIL # BLD AUTO: 0.1 X10E3/UL (ref 0–0.4)
EOSINOPHIL NFR BLD AUTO: 1 %
ERYTHROCYTE [DISTWIDTH] IN BLOOD BY AUTOMATED COUNT: 12.1 % (ref 11.6–15.4)
GLOBULIN SER CALC-MCNC: 1.9 G/DL (ref 1.5–4.5)
GLUCOSE SERPL-MCNC: 83 MG/DL (ref 70–99)
HCT VFR BLD AUTO: 46.9 % (ref 37.5–51)
HDLC SERPL-MCNC: 56 MG/DL
HGB BLD-MCNC: 15.3 G/DL (ref 13–17.7)
IMM GRANULOCYTES # BLD AUTO: 0 X10E3/UL (ref 0–0.1)
IMM GRANULOCYTES NFR BLD AUTO: 0 %
LDLC SERPL CALC-MCNC: 168 MG/DL (ref 0–99)
LYMPHOCYTES # BLD AUTO: 2.1 X10E3/UL (ref 0.7–3.1)
LYMPHOCYTES NFR BLD AUTO: 23 %
MCH RBC QN AUTO: 30.8 PG (ref 26.6–33)
MCHC RBC AUTO-ENTMCNC: 32.6 G/DL (ref 31.5–35.7)
MCV RBC AUTO: 95 FL (ref 79–97)
MONOCYTES # BLD AUTO: 0.8 X10E3/UL (ref 0.1–0.9)
MONOCYTES NFR BLD AUTO: 9 %
NEUTROPHILS # BLD AUTO: 6.1 X10E3/UL (ref 1.4–7)
NEUTROPHILS NFR BLD AUTO: 67 %
PLATELET # BLD AUTO: 252 X10E3/UL (ref 150–450)
POTASSIUM SERPL-SCNC: 4.8 MMOL/L (ref 3.5–5.2)
PROT SERPL-MCNC: 6.8 G/DL (ref 6–8.5)
PSA SERPL-MCNC: 0.4 NG/ML (ref 0–4)
RBC # BLD AUTO: 4.96 X10E6/UL (ref 4.14–5.8)
SODIUM SERPL-SCNC: 142 MMOL/L (ref 134–144)
TRIGL SERPL-MCNC: 112 MG/DL (ref 0–149)
TSH SERPL DL<=0.005 MIU/L-ACNC: 1.03 UIU/ML (ref 0.45–4.5)
VLDLC SERPL CALC-MCNC: 20 MG/DL (ref 5–40)
WBC # BLD AUTO: 9.1 X10E3/UL (ref 3.4–10.8)

## 2024-08-21 LAB
1OH-MIDAZOLAM UR QL SCN: NOT DETECTED NG/MG CREAT
6MAM UR QL SCN: NEGATIVE NG/ML
7AMINOCLONAZEPAM/CREAT UR: NOT DETECTED NG/MG CREAT
A-OH ALPRAZ/CREAT UR: NOT DETECTED NG/MG CREAT
A-OH-TRIAZOLAM/CREAT UR CFM: NOT DETECTED NG/MG CREAT
ALPRAZ/CREAT UR CFM: NOT DETECTED NG/MG CREAT
AMPHETAMINES UR QL SCN: NEGATIVE NG/ML
BARBITURATES UR QL SCN: NEGATIVE NG/ML
BENZODIAZ SCN METH UR: NEGATIVE
BUPRENORPHINE UR QL SCN: NEGATIVE
BUPRENORPHINE/CREAT UR: NOT DETECTED NG/MG CREAT
CANNABINOIDS UR QL SCN: NEGATIVE NG/ML
CLONAZEPAM/CREAT UR CFM: NOT DETECTED NG/MG CREAT
COCAINE+BZE UR QL SCN: NEGATIVE NG/ML
CREAT UR-MCNC: 63 MG/DL
DESALKYLFLURAZ/CREAT UR: NOT DETECTED NG/MG CREAT
DIAZEPAM/CREAT UR: NOT DETECTED NG/MG CREAT
ETHANOL UR QL SCN: NEGATIVE G/DL
FENTANYL CTO UR SCN-MCNC: NEGATIVE NG/ML
FENTANYL/CREAT UR: NOT DETECTED NG/MG CREAT
FLUNITRAZEPAM UR QL SCN: NOT DETECTED NG/MG CREAT
LORAZEPAM/CREAT UR: NOT DETECTED NG/MG CREAT
METHADONE UR QL SCN: NEGATIVE NG/ML
METHADONE+METAB UR QL SCN: NEGATIVE NG/ML
MIDAZOLAM/CREAT UR CFM: NOT DETECTED NG/MG CREAT
NORBUPRENORPHINE/CREAT UR: NOT DETECTED NG/MG CREAT
NORDIAZEPAM/CREAT UR: NOT DETECTED NG/MG CREAT
NORFENTANYL/CREAT UR: NOT DETECTED NG/MG CREAT
NORFLUNITRAZEPAM UR-MCNC: NOT DETECTED NG/MG CREAT
OPIATES UR SCN-MCNC: NEGATIVE NG/ML
OXAZEPAM/CREAT UR: NOT DETECTED NG/MG CREAT
OXYCODONE CTO UR SCN-MCNC: NEGATIVE NG/ML
PCP UR QL SCN: NEGATIVE NG/ML
PRESCRIBED MEDICATIONS: NORMAL
TAPENTADOL CTO UR SCN-MCNC: NEGATIVE NG/ML
TEMAZEPAM/CREAT UR: NOT DETECTED NG/MG CREAT
TRAMADOL UR QL SCN: NEGATIVE NG/ML

## 2024-11-06 ENCOUNTER — TELEPHONE (OUTPATIENT)
Dept: FAMILY MEDICINE CLINIC | Facility: CLINIC | Age: 44
End: 2024-11-06
Payer: COMMERCIAL

## 2024-11-06 DIAGNOSIS — R60.0 LEG EDEMA: Primary | ICD-10-CM

## 2024-11-06 NOTE — TELEPHONE ENCOUNTER
Allen Jung MD  You2 days ago       Please assist.     MD Mario Brooks Jr.2 days ago       Absolutely.  I will forward your message on to my  to get this taken care of.       Jesenia Polanco MA routed conversation to Allen Jung MD2 days ago     Mario East Jr.  P k  [facility] (supporting Allen Jung MD)2 days ago     RH  Dr. Jung, it’s that time again where I need to order compression garments for my right foot.  [facility] will do this for me but they need a referral from you to treat me and order.  At your leisure, can you send a referral indicating they can evaluate/treat Lymphedema?   Let me know if you have any questions.  Hope you’re doing well.       Parag  [phone]

## 2025-03-12 NOTE — PROGRESS NOTES
Case Management Discharge Note    Final Note: Discharged to home on 6/15/18. CARYL Macias RN, CCP    Destination     No service coordination in this encounter.      Durable Medical Equipment     No service coordination in this encounter.      Dialysis/Infusion     No service coordination in this encounter.      Home Medical Care     No service coordination in this encounter.      Social Care     No service coordination in this encounter.             Final Discharge Disposition Code: 01 - home or self-care   Individual Psychotherapy [facility]  3/12/25    Site/Location:  [facility]    Visit Type: 45 min outpt individual psychotherapy    Participants: Ct and this clinician.    Therapeutic Intervention: Met with patient Outpatient - Interactive psychotherapy 45 min - CPT code 66770    Chief complaint/reason for encounter: Depression / Anxiety    Interval history and content of current session:   Client arrived late for today's appointment.  Discussed importance of arriving on time for appointments and also that client has had former no-shows.  Discussed that another no-show would result in termination from psychotherapy with this clinician.  Client understood.  Discussed information from previous session regarding client using mindfulness and other activities to distract him ruminating.  Client reported  In the previous session that she will schedule time and reminders to accomplish this. Today, she reported that she did not work on this over the past 2 weeks.  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  Discussed the importance of applying coping methods and strategies in real-time.  Reviewed mindfulness methods including body scan.  Discussed reviewing more methods in next session.  Client agreed.  Today client denied any current or recent SI/HI.    Treatment plan:  Target symptoms: depression/anxiety  Why chosen therapy is appropriate versus another modality: Relevant to diagnosis  Outcome monitoring methods: self-report. CSSRS.   Therapeutic intervention type: supportive psychotherapy. IFS therapy, memory reconsolidation.     Risk parameters:  Patient reports no suicidal ideation  Patient reports no homicidal ideation  Patient reports no self-injurious behavior  Patient reports no violent behavior    Verbal deficits: None    Patient's response to intervention:  The patient's response to intervention is  accepting.    Progress toward goals and other mental status changes:  The patient's progress toward goals is good.    Diagnosis:   Bipolar 1 disorder  Episodic cannabis use    Plan:  Individual psychotherapy weekly. Utilize IFS therapy and memory reconsolidation to raise emotional tolerance and decrease negative symptoms. Ct acknowledged plan and is agreement with the plan.    Return to clinic: 1 week    Length of Service (minutes): 45       Each patient to whom he or she provides medical services by telemedicine is: (1) informed of the relationship between the physician and patient and the respective role of any other health care provider with respect to management of the patient; and (2) notified that he or she may decline to receive medical services by telemedicine and may withdraw from such care at any time.

## 2025-03-15 NOTE — PROGRESS NOTES
"Subjective   Mario East Jr. is a 44 y.o. male.     CC: Fatigue    History of Present Illness     Pt comes in today reporting recent onset of rather significant fatigue and headaches for the past 2 or so weeks. Pt reports no CP/Dyspnea and is slowly better from last week, but not back to his normal self yet. 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 No tick bites. Son did have mono end 2024. Pt exercised this am and did well.       The following portions of the patient's history were reviewed and updated as appropriate: allergies, current medications, past family history, past medical history, past social history, past surgical history, and problem list.    Review of Systems   Constitutional:  Positive for fatigue. Negative for chills, diaphoresis and fever.   HENT:  Positive for congestion. Negative for sore throat.    Respiratory:  Negative for cough.    Cardiovascular:  Negative for chest pain.   Gastrointestinal:  Negative for abdominal pain, nausea and vomiting.   Genitourinary:  Negative for dysuria.   Musculoskeletal:  Negative for myalgias and neck pain.   Skin:  Negative for rash.   Neurological:  Positive for headaches. Negative for weakness and numbness.       /72   Pulse 65   Temp 98.1 °F (36.7 °C) (Oral)   Resp 14   Ht 177.8 cm (70\")   Wt 86.2 kg (190 lb)   SpO2 99%   BMI 27.26 kg/m²     Objective   Physical Exam  Vitals and nursing note reviewed.   Constitutional:       General: He is not in acute distress.     Appearance: He is well-developed.   Cardiovascular:      Rate and Rhythm: Normal rate and regular rhythm.   Pulmonary:      Effort: Pulmonary effort is normal.      Breath sounds: Normal breath sounds.   Neurological:      Mental Status: He is alert and oriented to person, place, and time.   Psychiatric:         Behavior: Behavior normal.         Thought Content: Thought content normal.         Assessment & Plan   Diagnoses and all orders for this visit:    1. Pure hypercholesterolemia (Primary)  -  "    Lipid Panel    2. Other fatigue  -     Comprehensive Metabolic Panel  -     CBC & Differential  -     T4, Free  -     TSH  -     West Nile Antibodies, IgG & IgM  -     Mononucleosis Screen  -     CMV IgG IgM

## 2025-03-17 ENCOUNTER — OFFICE VISIT (OUTPATIENT)
Dept: FAMILY MEDICINE CLINIC | Facility: CLINIC | Age: 45
End: 2025-03-17
Payer: COMMERCIAL

## 2025-03-17 VITALS
SYSTOLIC BLOOD PRESSURE: 128 MMHG | RESPIRATION RATE: 14 BRPM | BODY MASS INDEX: 27.2 KG/M2 | HEART RATE: 65 BPM | TEMPERATURE: 98.1 F | WEIGHT: 190 LBS | OXYGEN SATURATION: 99 % | HEIGHT: 70 IN | DIASTOLIC BLOOD PRESSURE: 72 MMHG

## 2025-03-17 DIAGNOSIS — R53.83 OTHER FATIGUE: ICD-10-CM

## 2025-03-17 DIAGNOSIS — E78.00 PURE HYPERCHOLESTEROLEMIA: Primary | ICD-10-CM

## 2025-03-17 PROCEDURE — 99213 OFFICE O/P EST LOW 20 MIN: CPT | Performed by: FAMILY MEDICINE

## 2025-03-17 RX ORDER — PRAVASTATIN SODIUM 40 MG
40 TABLET ORAL DAILY
COMMUNITY

## 2025-03-20 LAB
ALBUMIN SERPL-MCNC: 4.9 G/DL (ref 4.1–5.1)
ALP SERPL-CCNC: 87 IU/L (ref 44–121)
ALT SERPL-CCNC: 28 IU/L (ref 0–44)
AST SERPL-CCNC: 22 IU/L (ref 0–40)
BASOPHILS # BLD AUTO: 0 X10E3/UL (ref 0–0.2)
BASOPHILS NFR BLD AUTO: 1 %
BILIRUB SERPL-MCNC: 0.4 MG/DL (ref 0–1.2)
BUN SERPL-MCNC: 21 MG/DL (ref 6–24)
BUN/CREAT SERPL: 18 (ref 9–20)
CALCIUM SERPL-MCNC: 10 MG/DL (ref 8.7–10.2)
CHLORIDE SERPL-SCNC: 105 MMOL/L (ref 96–106)
CHOLEST SERPL-MCNC: 188 MG/DL (ref 100–199)
CMV IGG SERPL IA-ACNC: <0.6 U/ML (ref 0–0.59)
CMV IGM SERPL IA-ACNC: <30 AU/ML (ref 0–29.9)
CO2 SERPL-SCNC: 25 MMOL/L (ref 20–29)
CREAT SERPL-MCNC: 1.18 MG/DL (ref 0.76–1.27)
EGFRCR SERPLBLD CKD-EPI 2021: 78 ML/MIN/1.73
EOSINOPHIL # BLD AUTO: 0 X10E3/UL (ref 0–0.4)
EOSINOPHIL NFR BLD AUTO: 1 %
ERYTHROCYTE [DISTWIDTH] IN BLOOD BY AUTOMATED COUNT: 11.7 % (ref 11.6–15.4)
GLOBULIN SER CALC-MCNC: 1.8 G/DL (ref 1.5–4.5)
GLUCOSE SERPL-MCNC: 85 MG/DL (ref 70–99)
HCT VFR BLD AUTO: 43.7 % (ref 37.5–51)
HDLC SERPL-MCNC: 46 MG/DL
HETEROPH AB SER QL LA: NEGATIVE
HGB BLD-MCNC: 14.4 G/DL (ref 13–17.7)
IMM GRANULOCYTES # BLD AUTO: 0 X10E3/UL (ref 0–0.1)
IMM GRANULOCYTES NFR BLD AUTO: 0 %
LDLC SERPL CALC-MCNC: 126 MG/DL (ref 0–99)
LYMPHOCYTES # BLD AUTO: 1.1 X10E3/UL (ref 0.7–3.1)
LYMPHOCYTES NFR BLD AUTO: 20 %
MCH RBC QN AUTO: 30.3 PG (ref 26.6–33)
MCHC RBC AUTO-ENTMCNC: 33 G/DL (ref 31.5–35.7)
MCV RBC AUTO: 92 FL (ref 79–97)
MONOCYTES # BLD AUTO: 0.5 X10E3/UL (ref 0.1–0.9)
MONOCYTES NFR BLD AUTO: 9 %
NEUTROPHILS # BLD AUTO: 4 X10E3/UL (ref 1.4–7)
NEUTROPHILS NFR BLD AUTO: 69 %
PLATELET # BLD AUTO: 225 X10E3/UL (ref 150–450)
POTASSIUM SERPL-SCNC: 4.8 MMOL/L (ref 3.5–5.2)
PROT SERPL-MCNC: 6.7 G/DL (ref 6–8.5)
RBC # BLD AUTO: 4.75 X10E6/UL (ref 4.14–5.8)
SODIUM SERPL-SCNC: 142 MMOL/L (ref 134–144)
T4 FREE SERPL-MCNC: 1.34 NG/DL (ref 0.82–1.77)
TRIGL SERPL-MCNC: 89 MG/DL (ref 0–149)
TSH SERPL DL<=0.005 MIU/L-ACNC: 1.66 UIU/ML (ref 0.45–4.5)
VLDLC SERPL CALC-MCNC: 16 MG/DL (ref 5–40)
WBC # BLD AUTO: 5.7 X10E3/UL (ref 3.4–10.8)
WNV IGG SER QL IA: NEGATIVE
WNV IGM SER QL IA: NEGATIVE

## 2025-03-29 RX ORDER — PRAVASTATIN SODIUM 40 MG
40 TABLET ORAL DAILY
Qty: 90 TABLET | Refills: 3 | Status: SHIPPED | OUTPATIENT
Start: 2025-03-29